# Patient Record
Sex: FEMALE | Race: WHITE | Employment: OTHER | URBAN - METROPOLITAN AREA
[De-identification: names, ages, dates, MRNs, and addresses within clinical notes are randomized per-mention and may not be internally consistent; named-entity substitution may affect disease eponyms.]

---

## 2017-01-10 ENCOUNTER — OFFICE VISIT (OUTPATIENT)
Dept: FAMILY MEDICINE | Facility: CLINIC | Age: 67
End: 2017-01-10

## 2017-01-10 VITALS
DIASTOLIC BLOOD PRESSURE: 80 MMHG | BODY MASS INDEX: 28.12 KG/M2 | RESPIRATION RATE: 20 BRPM | HEART RATE: 68 BPM | SYSTOLIC BLOOD PRESSURE: 120 MMHG | WEIGHT: 168.8 LBS | TEMPERATURE: 97.7 F | HEIGHT: 65 IN

## 2017-01-10 DIAGNOSIS — Z87.891 FORMER SMOKER: ICD-10-CM

## 2017-01-10 DIAGNOSIS — Z82.49 FAMILY HISTORY OF ISCHEMIC HEART DISEASE: ICD-10-CM

## 2017-01-10 DIAGNOSIS — F33.42 MAJOR DEPRESSIVE DISORDER, RECURRENT EPISODE, IN FULL REMISSION (H): ICD-10-CM

## 2017-01-10 DIAGNOSIS — Z00.00 ENCOUNTER FOR ROUTINE ADULT HEALTH EXAMINATION WITHOUT ABNORMAL FINDINGS: Primary | ICD-10-CM

## 2017-01-10 DIAGNOSIS — G47.00 INSOMNIA, UNSPECIFIED TYPE: ICD-10-CM

## 2017-01-10 DIAGNOSIS — Z13.228 SCREENING FOR METABOLIC DISORDER: ICD-10-CM

## 2017-01-10 DIAGNOSIS — Z86.2 HISTORY OF ANEMIA: ICD-10-CM

## 2017-01-10 DIAGNOSIS — K21.9 GASTROESOPHAGEAL REFLUX DISEASE, ESOPHAGITIS PRESENCE NOT SPECIFIED: ICD-10-CM

## 2017-01-10 DIAGNOSIS — Z11.59 NEED FOR HEPATITIS C SCREENING TEST: ICD-10-CM

## 2017-01-10 DIAGNOSIS — Z23 NEED FOR VACCINATION: ICD-10-CM

## 2017-01-10 DIAGNOSIS — E78.5 HYPERLIPIDEMIA LDL GOAL <160: ICD-10-CM

## 2017-01-10 LAB
% GRANULOCYTES: 61.1 %
HCT VFR BLD AUTO: 42 % (ref 35–47)
HEMOGLOBIN: 13.6 G/DL (ref 11.7–15.7)
LYMPHOCYTES NFR BLD AUTO: 29.3 %
MCH RBC QN AUTO: 29.5 PG (ref 26–33)
MCHC RBC AUTO-ENTMCNC: 32.4 G/DL (ref 31–36)
MCV RBC AUTO: 91.1 FL (ref 78–100)
MONOCYTES NFR BLD AUTO: 9.6 %
PLATELET COUNT - QUEST: 287 10^9/L (ref 150–375)
RBC # BLD AUTO: 4.61 10*12/L (ref 3.8–5.2)
WBC # BLD AUTO: 7.2 10*9/L (ref 4–11)

## 2017-01-10 PROCEDURE — 90686 IIV4 VACC NO PRSV 0.5 ML IM: CPT | Performed by: PHYSICIAN ASSISTANT

## 2017-01-10 PROCEDURE — 99396 PREV VISIT EST AGE 40-64: CPT | Mod: 25 | Performed by: PHYSICIAN ASSISTANT

## 2017-01-10 PROCEDURE — 36415 COLL VENOUS BLD VENIPUNCTURE: CPT | Performed by: PHYSICIAN ASSISTANT

## 2017-01-10 PROCEDURE — 86803 HEPATITIS C AB TEST: CPT | Mod: 90 | Performed by: PHYSICIAN ASSISTANT

## 2017-01-10 PROCEDURE — 85025 COMPLETE CBC W/AUTO DIFF WBC: CPT | Performed by: PHYSICIAN ASSISTANT

## 2017-01-10 PROCEDURE — 80061 LIPID PANEL: CPT | Mod: 90 | Performed by: PHYSICIAN ASSISTANT

## 2017-01-10 PROCEDURE — 90471 IMMUNIZATION ADMIN: CPT | Performed by: PHYSICIAN ASSISTANT

## 2017-01-10 PROCEDURE — 80053 COMPREHEN METABOLIC PANEL: CPT | Mod: 90 | Performed by: PHYSICIAN ASSISTANT

## 2017-01-10 RX ORDER — VENLAFAXINE HYDROCHLORIDE 150 MG/1
TABLET, EXTENDED RELEASE ORAL
Qty: 180 TABLET | Refills: 1 | Status: CANCELLED | OUTPATIENT
Start: 2017-01-10

## 2017-01-10 RX ORDER — TRAZODONE HYDROCHLORIDE 100 MG/1
100 TABLET ORAL
Qty: 180 TABLET | Refills: 1 | Status: CANCELLED | OUTPATIENT
Start: 2017-01-10

## 2017-01-10 RX ORDER — SIMVASTATIN 20 MG
TABLET ORAL
Qty: 90 TABLET | Refills: 3 | Status: SHIPPED | OUTPATIENT
Start: 2017-01-10 | End: 2017-09-27

## 2017-01-10 NOTE — PROGRESS NOTES
Chief Complaint: Physical Exam    SUBJECTIVE:   Alex Graham is a 66 year old female presents for routine health maintenance.    Current concerns: Has father that was recently found to have mitral regurgitation that required a mitral clip, and she is wondering if she should have a stress test.    Goes to psych PA two times per year, Navya Le, who prescribes her trazodone and Effexor. She is stable on this regime for both her depression and insomnia.     Menses are absent    No LMP recorded. Patient is postmenopausal.    Was last Pap smear normal: Yes  Due for mammogram:  No    Body mass index is 28.54 kg/(m^2).    Present exercise habits:  Nothing formal  Present dietary habits:  Really only eats dinner as she does not have an appetite for breakfast/lunch, likely in part due to reflux. Admits she eats a lot of sugar and ice cream. Does okay on fruits and veggies, but appetite is sporadic.    For her GERD, Alex has done trials of omeprazole, lansoprazole and ranitidine in the past. She did not tolerate these as they gave her neck stiffness that resolved when she stopped taking them.     Calcium intake:  Takes supplement.  Vit D intake: is taking supplement    Is the patient a smoker? No  If yes, smoking cessation advised and counseling provided.     Cardiovascular risk factors: previous smoker    Over the past few weeks, have you felt down or depressed? Little interest or pleasure in doing things? No    If in a relationship are there any Domestic violence concern: No    Last dental appointment:  this year  Last optical appointment:  this year    Was the patient born between [birth year] and has not had Hep C testing?  Yes, test will be ordered today    I have reviewed the following histories: Past Medical History, Past Surgical History, Social History, Family History, Problem List, Medication List and Allergies    Past Medical History   Diagnosis Date     Depressive disorder, not elsewhere classified      Sciatica  2000     L4-5, L2-3     Other acne      Alcohol abuse, in remission      Plantar fasciitis      Family History   Problem Relation Age of Onset     Thyroid Disease Mother      DIABETES Mother      C.A.D. Father      MI x4, stents, balloons, mitral regurgitation recently clipped     Heart Failure Mother      Ulcerative Colitis Sister      Emphysema Paternal Grandmother      Social History     Social History     Marital Status:      Spouse Name: N/A     Number of Children: 2     Years of Education: N/A     Occupational History     teacher Retired     2016     Social History Main Topics     Smoking status: Former Smoker -- 0.50 packs/day for 4 years     Types: Cigarettes     Smokeless tobacco: Never Used      Comment: smoked as a teen     Alcohol Use: No      Comment: alcoholic in remission since 20105     Drug Use: No     Sexual Activity:     Partners: Male     Other Topics Concern     Exercise Yes     doesn't exercise much during schoolyear     Seat Belt Yes     Social History Narrative     Past Surgical History   Procedure Laterality Date     C nonspecific procedure       BREAST REDUCTION     Diskectomy, lumbar, single sp  10/00     L4-5     C open treatment prox humeral fracture  1/00     Colonoscopy  12-15-08     normal  repeat in 10 years       ROS:  E/M: NEGATIVE for ear, nose, mouth and throat problems  R: NEGATIVE for significant/chronic cough or SOB  CV: NEGATIVE for chest pain or palpitations  GI: NEGATIVE for abdominal pain, chronic diarrhea or constipation  :  NEGATIVE for dysuria, hematuria or vaginal discharge. No sexual health concerns.       Current Outpatient Prescriptions   Medication     simvastatin (ZOCOR) 20 MG tablet     venlafaxine (EFFEXOR-ER) 150 MG TB24     traZODone (DESYREL) 100 MG tablet     CALCIUM-MAGNESUIUM-ZINC 333-133-8.3 MG TABS     GLUCOSAMINE OR     IRON (FERROUS SULFATE) TABS 325 MG OR     No current facility-administered medications for this visit.       Patient Active  "Problem List    Diagnosis Date Noted     Health Care Home 11/30/2012     Priority: Low     State Tier Level:  Tier 1  Status:  NA  Care Coordinator:      See Letters for H Care Plan           ACP (advance care planning) 10/19/2010     Priority: Low     Advance Care Planning 11/18/2015: ACP Review and Resources Provided:  Reviewed chart for advance care plan.  Alex Graham has no plan or code status on file. Discussed available resources and provided with information. Confirmed code status reflects current choices pending further ACP discussions.  Confirmed/documented legally designated decision maker(s). Added by Tawana Arthur               Major depressive disorder, recurrent episode, in full remission (H) 03/08/2016     Per [facility] records 2016       H pylori ulcer 07/18/2013     Bipolar affective (H) 03/15/2011     Hyperlipidemia LDL goal <160 08/23/2008     Family history of diabetes mellitus 07/06/2007         OBJECTIVE:  /80 mmHg  Pulse 68  Temp(Src) 97.7  F (36.5  C) (Oral)  Resp 20  Ht 1.638 m (5' 4.5\")  Wt 76.567 kg (168 lb 12.8 oz)  BMI 28.54 kg/m2        General: 66 year old female who appears her stated age. Vital signs noted.  Head: Normocephalic  Eyes: pupils equal round reactive to light and accomodation, extra ocular movements intact  Ears: external canals and TMs free of abnormalities  Nose: patent, without mucosal abnormalities  Mouth and throat: without erythema or lesions of the mucosa  Neck: supple, without adenopathy or thyromegaly  Lungs: clear to auscultation, no wheezing or crackles  Breasts: Deferred  Cv: regular rate and rhythm, normal s1 and s2 without murmur or click  Abd: soft, non-tender, no masses, no hepatomegaly or splenomegaly.   (female): Deferred  Ms: normal muscle tone & symmetry  Skin: clear to inspection and with no palpable abnormalities.  Neuro: sensation and motor function grossly intact; cranial nerves without obvious " "abnormalities.    ASSESSMENT/PLAN:    1. Encounter for routine adult health examination without abnormal findings  Health maintenance up to date. Recommended Dexa scan and pneumonia shot at next years physical    2. Hyperlipidemia LDL goal <160  - simvastatin (ZOCOR) 20 MG tablet; TAKE 1 TABLET (20 MG) BY MOUTH AT BEDTIME  Dispense: 90 tablet; Refill: 3  - VENOUS COLLECTION  - Lipid Profile ([facility])    3. Screening for metabolic disorder  - VENOUS COLLECTION  - COMPREHENSIVE METABOLIC PANEL ([facility]) XCMP    4. Need for hepatitis C screening test  - VENOUS COLLECTION  - Hepatitis C antibody    5. History of anemia  - VENOUS COLLECTION  - CL AFF HEMOGRAM/PLATE/DIFF ([facility])    6. Major depressive disorder, recurrent episode, in full remission (H)  Effexor managed by psychologist. PHQ-9 score today is 2.     7. Insomnia, unspecified type  Well-controlled on trazodone, prescription by psych PA.     8. Gastroesophageal reflux disease, esophagitis presence not specified  Recommended trial on Nexium if tolerated and effective, will do a prior authorization to have this covered.     9. Family history of ischemic heart disease  Given significant family history in her father, as well as Alex's risk factors of former smoker, hyperlipidemia, sedentary lifestyle, recommended a stress echo.            reports that she has quit smoking. She has never used smokeless tobacco.      Estimated body mass index is 28.54 kg/(m^2) as calculated from the following:    Height as of this encounter: 1.638 m (5' 4.5\").    Weight as of this encounter: 76.567 kg (168 lb 12.8 oz).  Weight management plan: Discussed healthy diet and exercise guidelines and patient will follow up in 12 months in clinic to re-evaluate.      Labs pending:      Fasting glucose      Fasting lipids      Hepatitis C  Meds Suggested:      Vitamin D       Calcium  Tests Recommended:      Regular Dental Examinations        Eye exam  Behavior Modifications:       Cardiovascular " exercise 3 times per week--enough to get your Target Heart rate  Other recommendations:     BMI noted and discussed      Regular breast exam     Encouraged My Chart    Counseling Resources:  ATP IV Guidelines  Pooled Cohorts Equation Calculator  Breast Cancer Risk Calculator  FRAX Risk Assessment  ICSI Preventive Guidelines  Dietary Guidelines for Americans, 2010  [employer] MyPlate            Iris Giron PA-C  1/10/2017

## 2017-01-10 NOTE — NURSING NOTE
Alex WAN Santos is here for a CPX.    Pre-visit planning  Immunizations -up to date  Colonoscopy -is up to date  Mammogram -is up to date  Asthma test --  PHQ9 -  ANNELISE 7 -    Questioned patient about current smoking habits.  Pt. quit smoking some time ago.  Body mass index is 28.54 kg/(m^2).  BP Cuff L  Arm  M  Cuff  PULSE regular  My Chart:   CLASSIFICATION OF OVERWEIGHT AND OBESITY BY BMI                        Obesity Class           BMI(kg/m2)  Underweight                                    < 18.5  Normal                                         18.5-24.9  Overweight                                     25.0-29.9  OBESITY                     I                  30.0-34.9                             II                 35.0-39.9  EXTREME OBESITY             III                >40                            Patient's  BMI Body mass index is 28.54 kg/(m^2).  Http://hin.nhlbi.nih.gov/menuplanner/menu.cgi  ETOH screening:  Questions:  1-How often do you have a drink containing alcohol?                             0 times per   2-How many drinks containing alcohol do you have on a typical day when you are         Drinking?                                 3- How often do you have 5 or more drinks on one occasion?                               per     Have you ever:  @None of the patient's responses to the CAGE screening were positive / Negative CAGE score@

## 2017-01-11 LAB
ALBUMIN SERPL-MCNC: 4 G/DL (ref 3.6–5.1)
ALBUMIN/GLOB SERPL: 1.7 (CALC) (ref 1–2.5)
ALP SERPL-CCNC: 61 U/L (ref 33–130)
ALT SERPL-CCNC: 30 U/L (ref 6–29)
AST SERPL-CCNC: 23 U/L (ref 10–35)
BILIRUB SERPL-MCNC: 0.4 MG/DL (ref 0.2–1.2)
BUN SERPL-MCNC: 12 MG/DL (ref 7–25)
BUN/CREATININE RATIO: ABNORMAL (CALC) (ref 6–22)
CALCIUM SERPL-MCNC: 9.3 MG/DL (ref 8.6–10.4)
CHLORIDE SERPLBLD-SCNC: 102 MMOL/L (ref 98–110)
CHOLEST SERPL-MCNC: 229 MG/DL (ref 125–200)
CHOLEST/HDLC SERPL: 4.2 (CALC)
CO2 SERPL-SCNC: 26 MMOL/L (ref 20–31)
CREAT SERPL-MCNC: 0.82 MG/DL (ref 0.5–0.99)
EGFR AFRICAN AMERICAN - QUEST: 88 ML/MIN/1.73M2
GFR SERPL CREATININE-BSD FRML MDRD: 76 ML/MIN/1.73M2
GLOBULIN, CALCULATED - QUEST: 2.3 G/DL (CALC) (ref 1.9–3.7)
GLUCOSE - QUEST: 95 MG/DL (ref 65–99)
HCV AB - QUEST: NORMAL
HDLC SERPL-MCNC: 54 MG/DL
LDLC SERPL CALC-MCNC: 145 MG/DL (CALC)
NONHDLC SERPL-MCNC: 175 MG/DL (CALC)
POTASSIUM SERPL-SCNC: 4.3 MMOL/L (ref 3.5–5.3)
PROT SERPL-MCNC: 6.3 G/DL (ref 6.1–8.1)
SIGNAL TO CUT OFF - QUEST: 0.06
SODIUM SERPL-SCNC: 137 MMOL/L (ref 135–146)
TRIGL SERPL-MCNC: 151 MG/DL

## 2017-01-11 ASSESSMENT — PATIENT HEALTH QUESTIONNAIRE - PHQ9: SUM OF ALL RESPONSES TO PHQ QUESTIONS 1-9: 2

## 2017-01-17 ENCOUNTER — HOSPITAL ENCOUNTER (OUTPATIENT)
Dept: CARDIOLOGY | Facility: CLINIC | Age: 67
Discharge: HOME | End: 2017-01-17
Attending: PHYSICIAN ASSISTANT | Admitting: PHYSICIAN ASSISTANT
Payer: COMMERCIAL

## 2017-01-17 DIAGNOSIS — E78.5 HYPERLIPIDEMIA LDL GOAL <160: ICD-10-CM

## 2017-01-17 DIAGNOSIS — Z87.891 FORMER SMOKER: ICD-10-CM

## 2017-01-17 DIAGNOSIS — Z82.49 FAMILY HISTORY OF ISCHEMIC HEART DISEASE: ICD-10-CM

## 2017-01-17 PROCEDURE — 93018 CV STRESS TEST I&R ONLY: CPT | Performed by: INTERNAL MEDICINE

## 2017-01-17 PROCEDURE — 93350 STRESS TTE ONLY: CPT | Mod: 26 | Performed by: INTERNAL MEDICINE

## 2017-01-17 PROCEDURE — 93321 DOPPLER ECHO F-UP/LMTD STD: CPT | Mod: 26 | Performed by: INTERNAL MEDICINE

## 2017-01-17 PROCEDURE — 93325 DOPPLER ECHO COLOR FLOW MAPG: CPT | Mod: 26 | Performed by: INTERNAL MEDICINE

## 2017-01-17 PROCEDURE — 40000264 ECHO STRESS TEST WITH LUMASON

## 2017-01-17 PROCEDURE — 25500064 ZZH RX 255 OP 636: Performed by: PHYSICIAN ASSISTANT

## 2017-01-17 RX ADMIN — SULFUR HEXAFLUORIDE 5 ML: KIT at 08:28

## 2017-02-28 ENCOUNTER — TELEPHONE (OUTPATIENT)
Dept: FAMILY MEDICINE | Facility: CLINIC | Age: 67
End: 2017-02-28

## 2017-02-28 NOTE — TELEPHONE ENCOUNTER
Pt called about her Acid reflux and the medication that she was put on for it.   Tried to return the call back to Pt but the answer machine picked up and said to put in remote number.   Will call back later today.   THOMAS Pace ([facility])

## 2017-02-28 NOTE — TELEPHONE ENCOUNTER
Corina,     Pt called about the Nexium and how long she can take it?  She states that she is good when she is on it, but when she is off from it is when she is having problems.   She also states that he had enough of the samples until 3 days ago.   She has some questions for you like will this medication give her problems with her kidneys long term?  How long can she continue taking the medication.     Please advise.     Georgie.THOMAS Cool ([facility])    Pt's number: [phone]

## 2017-03-01 DIAGNOSIS — K21.9 GASTROESOPHAGEAL REFLUX DISEASE, ESOPHAGITIS PRESENCE NOT SPECIFIED: Primary | ICD-10-CM

## 2017-03-02 NOTE — PROGRESS NOTES
Doing well while on it. Bad when goes off it. Will do PA/medical necessity as needed for this med to get covered. Will monitor for side effects.

## 2017-03-10 ENCOUNTER — TELEPHONE (OUTPATIENT)
Dept: FAMILY MEDICINE | Facility: CLINIC | Age: 67
End: 2017-03-10

## 2017-03-10 NOTE — TELEPHONE ENCOUNTER
Medical records release from [facility]. Authorization ongoing communication between pcp and [facility].

## 2017-09-27 ENCOUNTER — OFFICE VISIT (OUTPATIENT)
Dept: FAMILY MEDICINE | Facility: CLINIC | Age: 67
End: 2017-09-27

## 2017-09-27 VITALS
WEIGHT: 165.2 LBS | OXYGEN SATURATION: 98 % | SYSTOLIC BLOOD PRESSURE: 114 MMHG | DIASTOLIC BLOOD PRESSURE: 82 MMHG | HEART RATE: 70 BPM | TEMPERATURE: 98.3 F | BODY MASS INDEX: 27.52 KG/M2 | HEIGHT: 65 IN

## 2017-09-27 DIAGNOSIS — Z23 NEED FOR VACCINATION: ICD-10-CM

## 2017-09-27 DIAGNOSIS — E78.2 MIXED HYPERLIPIDEMIA: ICD-10-CM

## 2017-09-27 DIAGNOSIS — R53.81 MALAISE: ICD-10-CM

## 2017-09-27 PROCEDURE — 90686 IIV4 VACC NO PRSV 0.5 ML IM: CPT | Performed by: FAMILY MEDICINE

## 2017-09-27 PROCEDURE — 80053 COMPREHEN METABOLIC PANEL: CPT | Mod: 90 | Performed by: FAMILY MEDICINE

## 2017-09-27 PROCEDURE — 84443 ASSAY THYROID STIM HORMONE: CPT | Mod: 90 | Performed by: FAMILY MEDICINE

## 2017-09-27 PROCEDURE — 99213 OFFICE O/P EST LOW 20 MIN: CPT | Mod: 25 | Performed by: FAMILY MEDICINE

## 2017-09-27 PROCEDURE — G0009 ADMIN PNEUMOCOCCAL VACCINE: HCPCS | Performed by: FAMILY MEDICINE

## 2017-09-27 PROCEDURE — 90670 PCV13 VACCINE IM: CPT | Performed by: FAMILY MEDICINE

## 2017-09-27 PROCEDURE — 80061 LIPID PANEL: CPT | Mod: 90 | Performed by: FAMILY MEDICINE

## 2017-09-27 PROCEDURE — G0008 ADMIN INFLUENZA VIRUS VAC: HCPCS | Performed by: FAMILY MEDICINE

## 2017-09-27 PROCEDURE — 36415 COLL VENOUS BLD VENIPUNCTURE: CPT | Performed by: FAMILY MEDICINE

## 2017-09-27 RX ORDER — SIMVASTATIN 20 MG
TABLET ORAL
Qty: 90 TABLET | Refills: 3 | Status: SHIPPED | OUTPATIENT
Start: 2017-09-27 | End: 2018-08-10

## 2017-09-27 NOTE — MR AVS SNAPSHOT
"              After Visit Summary   9/27/2017    Alex Graham    MRN: [id number]           Patient Information     Date Of Birth          1950        Visit Information        Provider Department      9/27/2017 7:30 AM Taylor Villanueva MD [facility]        Today's Diagnoses     Mixed hyperlipidemia        Malaise        Need for vaccination           Follow-ups after your visit        Who to contact     If you have questions or need follow up information about today's clinic visit or your schedule please contact [facility] directly at [phone].  Normal or non-critical lab and imaging results will be communicated to you by MyChart, letter or phone within 4 business days after the clinic has received the results. If you do not hear from us within 7 days, please contact the clinic through MyChart or phone. If you have a critical or abnormal lab result, we will notify you by phone as soon as possible.  Submit refill requests through [employer] or call your pharmacy and they will forward the refill request to us. Please allow 3 business days for your refill to be completed.          Additional Information About Your Visit        Care EveryWhere ID     This is your Care EveryWhere ID. This could be used by other organizations to access your [facility] medical records  [id number]        Your Vitals Were     Pulse Temperature Height Pulse Oximetry Breastfeeding? BMI (Body Mass Index)    70 98.3  F (36.8  C) (Oral) 1.638 m (5' 4.5\") 98% No 27.92 kg/m2       Blood Pressure from Last 3 Encounters:   09/27/17 114/82   01/10/17 120/80   04/27/16 128/78    Weight from Last 3 Encounters:   09/27/17 74.9 kg (165 lb 3.2 oz)   01/10/17 76.6 kg (168 lb 12.8 oz)   04/27/16 79.8 kg (176 lb)              We Performed the Following     ADMIN: Vaccine, Initial (74719)     Comprehensive metabolic panel     HC FLU VAC PRESRV FREE QUAD SPLIT VIR 3+YRS IM     Lipid Profile     " Pneumococcal vaccine 13 valent PCV13 IM (Prevnar) [40438]     TSH with free T4 reflex (QUEST)     VACCINE ADMINISTRATION, EACH ADDITIONAL     VENOUS COLLECTION          Today's Medication Changes          These changes are accurate as of: 9/27/17  9:44 PM.  If you have any questions, ask your nurse or doctor.               These medicines have changed or have updated prescriptions.        Dose/Directions    esomeprazole 20 MG CR capsule   Commonly known as:  nexIUM   This may have changed:  Another medication with the same name was removed. Continue taking this medication, and follow the directions you see here.   Used for:  Gastroesophageal reflux disease, esophagitis presence not specified   Changed by:  Iris Giron PA-C        Dose:  20 mg   Take 1 capsule (20 mg) by mouth every morning (before breakfast) Take 30-60 minutes before eating.   Quantity:  30 capsule   Refills:  11            Where to get your medicines      These medications were sent to [id number] - [place] [id number] [address]     Phone:  [phone]     simvastatin 20 MG tablet                Primary Care Provider Office Phone # Fax #    Taylor Villanueva [phone] [address] [address]        Equal Access to Services     [place] : Derick padillao Sojj, waaxda luqadaha, qaybta kaalmada adeegyada, luis leroy. So [facility] [phone].    ATENCIÓN: Si habla español, tiene a patel disposición servicios gratuitos de asistencia lingüística. Llame al [phone].    We comply with applicable federal civil rights laws and [place] laws. We do not discriminate on the basis of race, color, national origin, age, disability sex, sexual orientation or gender identity.            Thank you!     Thank you for choosing [facility]  for your care. Our goal is always to provide you  with excellent care. Hearing back from our patients is one way we can continue to improve our services. Please take a few minutes to complete the written survey that you may receive in the mail after your visit with us. Thank you!             Your Updated Medication List - Protect others around you: Learn how to safely use, store and throw away your medicines at www.disposemymeds.org.          This list is accurate as of: 9/27/17  9:44 PM.  Always use your most recent med list.                   Brand Name Dispense Instructions for use Diagnosis    CALCIUM-MAGNESUIUM-ZINC 333-133-8.3 MG Tabs      Take 1 tablet by mouth daily.        esomeprazole 20 MG CR capsule    nexIUM    30 capsule    Take 1 capsule (20 mg) by mouth every morning (before breakfast) Take 30-60 minutes before eating.    Gastroesophageal reflux disease, esophagitis presence not specified       GLUCOSAMINE PO      None Entered        IRON (FERROUS SULFATE) TABS 325 MG OR      1 TABLET DAILY        simvastatin 20 MG tablet    ZOCOR    90 tablet    TAKE 1 TABLET (20 MG) BY MOUTH AT BEDTIME    Mixed hyperlipidemia       traZODone 100 MG tablet    DESYREL    180 tablet    Take 1 tablet (100 mg) by mouth nightly as needed for sleep        venlafaxine 150 MG Tb24 24 hr tablet    EFFEXOR-ER    180 tablet    TAKE 2 TABLETS (300 MG) BY MOUTH DAILY (WITH BREAKFAST)

## 2017-09-27 NOTE — PROGRESS NOTES
"  SUBJECTIVE:   Alex Graham is a 67 year old female who presents to clinic today for the following health issues:      Hyperlipidemia Follow-Up      Rate your low fat/cholesterol diet?: \"bad\"    Eating lots of vegetables (has on garden)- sweets    Eats salty foods- trial mix, salted nuts- crackers        Taking statin?   (has been taking simvastatin only for the past two weeks)        Other lipid medications/supplements?:  none        Amount of exercise or physical activity: working at [employer]    Encouraged regular exercise:     Problems taking medications regularly: Yes,  Lost her Simvastatin-    Medication side effects: none    PROBLEMS TO ADD ON...    More tired: more physical work  Depression- controlled  Navya KAY manages    Problem list and histories reviewed & adjusted, as indicated.  Additional history: as documented    Patient Active Problem List   Diagnosis     Family history of diabetes mellitus     Hyperlipidemia LDL goal <160     ACP (advance care planning)     Bipolar affective (H)     Health Care Home     H pylori ulcer     Major depressive disorder, recurrent episode, in full remission (H)     Past Surgical History:   Procedure Laterality Date     C NONSPECIFIC PROCEDURE      BREAST REDUCTION     COLONOSCOPY  12-15-08    normal  repeat in 10 years     DISKECTOMY, LUMBAR, SINGLE SP  10/00    L4-5     HC OPEN TREATMENT PROX HUMERAL FRACTURE  1/00       Social History   Substance Use Topics     Smoking status: Former Smoker     Packs/day: 0.50     Years: 4.00     Types: Cigarettes     Smokeless tobacco: Never Used      Comment: smoked as a teen     Alcohol use No      Comment: alcoholic in remission since 20105     Family History   Problem Relation Age of Onset     Thyroid Disease Mother      DIABETES Mother      C.A.D. 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 No  BMI 27.92 kg/m2  Body mass index is 27.92 kg/(m^2).   Regular rate and  rhythm. S1 and S2 normal, no murmurs, clicks, gallops or rubs. No edema or JVD. Chest is clear; no wheezes or rales.      Diagnostic Test Results:  No results found for this or any previous visit (from the past 24 hour(s)).    ASSESSMENT/PLAN:     Problem List Items Addressed This Visit     Hyperlipidemia LDL goal <160    Relevant Medications    simvastatin (ZOCOR) 20 MG tablet      Other Visit Diagnoses     Mixed hyperlipidemia    -  Primary    Relevant Medications    simvastatin (ZOCOR) 20 MG tablet    Other Relevant Orders    Lipid Profile    Need for vaccination        Relevant Orders    Pneumococcal vaccine 13 valent PCV13 IM (Prevnar) [92195] (Completed)    ADMIN: Vaccine, Initial (93165) (Completed)    HC FLU VAC PRESRV FREE QUAD SPLIT VIR 3+YRS IM (Completed)    VACCINE ADMINISTRATION, EACH ADDITIONAL (Completed)    Malaise        Relevant Orders    Comprehensive metabolic panel    TSH with free T4 reflex (QUEST)           BMI:   Estimated body mass index is 27.92 kg/(m^2) as calculated from the following:    Height as of this encounter: 1.638 m (5' 4.5\").    Weight as of this encounter: 74.9 kg (165 lb 3.2 oz).   Weight management plan: Discussed healthy diet and exercise guidelines and patient will follow " up in 12 months in clinic to re-evaluate.        MEDICATIONS:  Continue current medications without change  Lipid result may be difficult to interpret- just recently started taking  Work on weight loss  Regular exercise  Recheck in one year    Taylor Villanueva MD  [facility]

## 2017-09-27 NOTE — NURSING NOTE
Alex is here for a fasting medication recheck and blood work.     Pre-Visit Screening :  Immunizations : not up to date - Pneumococcal     Colonoscopy : is up to date  Mammogram : is up to date  Asthma Action Test/Plan : NA  PHQ9/GAD7 :  Seeing another provider for this.     Pulse - regular  My Chart - declines    CLASSIFICATION OF OVERWEIGHT AND OBESITY BY BMI                         Obesity Class           BMI(kg/m2)  Underweight                                    < 18.5  Normal                                         18.5-24.9  Overweight                                     25.0-29.9  OBESITY                     I                  30.0-34.9                              II                 35.0-39.9  EXTREME OBESITY             III                >40                             Patient's  BMI Body mass index is 27.92 kg/(m^2).  http://hin.nhlbi.nih.gov/menuplanner/menu.cgi  Questioned patient about current smoking habits.  Pt. Quit sometime ago    Georgie.THOMAS Cool ([facility])

## 2017-09-27 NOTE — LETTER
September 28, 2017      Alex Graham  [address]  [place] [address]        Dear ,    We are writing to inform you of your test results.    Lipid results are difficult to interpret with dosing just for the past two weeks.   Please continue taking your current dose of Simvastatin but repeat your fasting lipid profile in three months (this can be a lab only appointment). A change in medication or dose adjustment may be needed.    Thyroid function test is Normal.  Your comprehensive profile including blood sugar, potassium and kidney and liver function tests were Normal.    Let me know if you have questions.    Resulted Orders   Lipid Profile   Result Value Ref Range    Cholesterol 229 (H) <200 mg/dL    HDL Cholesterol 60 >50 mg/dL    Triglycerides 62 <150 mg/dL    LDL Cholesterol Calculated 154 (H) mg/dL (calc)      Comment:      Reference range: <100     Desirable range <100 mg/dL for patients with CHD or  diabetes and <70 mg/dL for diabetic patients with  known heart disease.     LDL-C is now calculated using the Vishal   calculation, which is a validated novel method providing   better accuracy than the Friedewald equation in the   estimation of LDL-C.   Buster SS et al. BRI. 2013;310(19): 3390-7913   (http://education."[employer]orb[employer]/faq/BQP431)      Cholesterol/HDL Ratio 3.8 <5.0 (calc)    Non HDL Cholesterol 169 (H) <130 mg/dL (calc)      Comment:      For patients with diabetes plus 1 major ASCVD risk   factor, treating to a non-HDL-C goal of <100 mg/dL   (LDL-C of <70 mg/dL) is considered a therapeutic   option.     Comprehensive metabolic panel   Result Value Ref Range    Glucose 98 65 - 99 mg/dL      Comment:                    Fasting reference interval         Urea Nitrogen 15 7 - 25 mg/dL    Creatinine 0.76 0.50 - 0.99 mg/dL      Comment:      For patients >49 years of age, the reference limit  for Creatinine is approximately 13% higher for people  identified as  -American.         GFR Estimate 82 > OR = 60 mL/min/1.73m2    EGFR African American 95 > OR = 60 mL/min/1.73m2    BUN/Creatinine Ratio NOT APPLICABLE 6 - 22 (calc)    Sodium 138 135 - 146 mmol/L    Potassium 4.2 3.5 - 5.3 mmol/L    Chloride 104 98 - 110 mmol/L    Carbon Dioxide 23 20 - 31 mmol/L    Calcium 9.1 8.6 - 10.4 mg/dL    Protein Total 6.5 6.1 - 8.1 g/dL    Albumin 4.2 3.6 - 5.1 g/dL    Globulin Calculated 2.3 1.9 - 3.7 g/dL (calc)    A/G Ratio 1.8 1.0 - 2.5 (calc)    Bilirubin Total 0.6 0.2 - 1.2 mg/dL    Alkaline Phosphatase 64 33 - 130 U/L    AST 18 10 - 35 U/L    ALT 18 6 - 29 U/L   TSH with free T4 reflex (QUEST)   Result Value Ref Range    TSH 4.08 0.40 - 4.50 mIU/L       If you have any questions or concerns, please call the clinic at the number listed above.       Sincerely,        Taylor Villanueva MD

## 2017-09-28 LAB
ALBUMIN SERPL-MCNC: 4.2 G/DL (ref 3.6–5.1)
ALBUMIN/GLOB SERPL: 1.8 (CALC) (ref 1–2.5)
ALP SERPL-CCNC: 64 U/L (ref 33–130)
ALT SERPL-CCNC: 18 U/L (ref 6–29)
AST SERPL-CCNC: 18 U/L (ref 10–35)
BILIRUB SERPL-MCNC: 0.6 MG/DL (ref 0.2–1.2)
BUN SERPL-MCNC: 15 MG/DL (ref 7–25)
BUN/CREATININE RATIO: NORMAL (CALC) (ref 6–22)
CALCIUM SERPL-MCNC: 9.1 MG/DL (ref 8.6–10.4)
CHLORIDE SERPLBLD-SCNC: 104 MMOL/L (ref 98–110)
CHOLEST SERPL-MCNC: 229 MG/DL
CHOLEST/HDLC SERPL: 3.8 (CALC)
CO2 SERPL-SCNC: 23 MMOL/L (ref 20–31)
CREAT SERPL-MCNC: 0.76 MG/DL (ref 0.5–0.99)
EGFR AFRICAN AMERICAN - QUEST: 95 ML/MIN/1.73M2
GFR SERPL CREATININE-BSD FRML MDRD: 82 ML/MIN/1.73M2
GLOBULIN, CALCULATED - QUEST: 2.3 G/DL (CALC) (ref 1.9–3.7)
GLUCOSE - QUEST: 98 MG/DL (ref 65–99)
HDLC SERPL-MCNC: 60 MG/DL
LDLC SERPL CALC-MCNC: 154 MG/DL (CALC)
NONHDLC SERPL-MCNC: 169 MG/DL (CALC)
POTASSIUM SERPL-SCNC: 4.2 MMOL/L (ref 3.5–5.3)
PROT SERPL-MCNC: 6.5 G/DL (ref 6.1–8.1)
SODIUM SERPL-SCNC: 138 MMOL/L (ref 135–146)
TRIGL SERPL-MCNC: 62 MG/DL
TSH SERPL-ACNC: 4.08 MIU/L (ref 0.4–4.5)

## 2017-12-22 ENCOUNTER — TRANSFERRED RECORDS (OUTPATIENT)
Dept: FAMILY MEDICINE | Facility: CLINIC | Age: 67
End: 2017-12-22

## 2017-12-22 DIAGNOSIS — E78.2 MIXED HYPERLIPIDEMIA: ICD-10-CM

## 2017-12-22 PROCEDURE — 80061 LIPID PANEL: CPT | Mod: 90 | Performed by: FAMILY MEDICINE

## 2017-12-22 PROCEDURE — 36415 COLL VENOUS BLD VENIPUNCTURE: CPT | Performed by: FAMILY MEDICINE

## 2017-12-22 NOTE — LETTER
December 26, 2017      Alex Graham  [address]  [place] [address]        Dear ,    We are writing to inform you of your test results.    Lipid profile results are similar to last September.  LDL or bad cholesterol remains mildly elevated. I recommend increasing Simvastatin to 40 mg - this will require another fasting lipid and liver lab test in 8-12 weeks.  Let me know if you are agreeable.    Resulted Orders   Lipid Profile (QUEST)   Result Value Ref Range    Cholesterol 223 (H) <200 mg/dL    HDL Cholesterol 51 >50 mg/dL    Triglycerides 148 <150 mg/dL    LDL Cholesterol Calculated 145 (H) mg/dL (calc)      Comment:      Reference range: <100     Desirable range <100 mg/dL for patients with CHD or  diabetes and <70 mg/dL for diabetic patients with  known heart disease.     LDL-C is now calculated using the Buster-Vandana   calculation, which is a validated novel method providing   better accuracy than the Friedewald equation in the   estimation of LDL-C.   Buster SS et al. BRI. 2013;310(19): 6748-7428   (http://education."[employer]/faq/BAR345)      Cholesterol/HDL Ratio 4.4 <5.0 (calc)    Non HDL Cholesterol 172 (H) <130 mg/dL (calc)      Comment:      For patients with diabetes plus 1 major ASCVD risk   factor, treating to a non-HDL-C goal of <100 mg/dL   (LDL-C of <70 mg/dL) is considered a therapeutic   option.         If you have any questions or concerns, please call the clinic at the number listed above.       Sincerely,        Taylor Villanueva MD

## 2017-12-23 LAB
CHOLEST SERPL-MCNC: 223 MG/DL
CHOLEST/HDLC SERPL: 4.4 (CALC)
HDLC SERPL-MCNC: 51 MG/DL
LDLC SERPL CALC-MCNC: 145 MG/DL (CALC)
NONHDLC SERPL-MCNC: 172 MG/DL (CALC)
TRIGL SERPL-MCNC: 148 MG/DL

## 2018-08-10 DIAGNOSIS — E78.2 MIXED HYPERLIPIDEMIA: ICD-10-CM

## 2018-08-10 RX ORDER — SIMVASTATIN 20 MG
TABLET ORAL
Qty: 30 TABLET | Refills: 0 | COMMUNITY
Start: 2018-08-10 | End: 2018-10-23

## 2018-08-10 RX ORDER — SIMVASTATIN 20 MG
TABLET ORAL
Qty: 90 TABLET | Refills: 2 | OUTPATIENT
Start: 2018-08-10

## 2018-10-16 DIAGNOSIS — E78.2 MIXED HYPERLIPIDEMIA: ICD-10-CM

## 2018-10-16 NOTE — TELEPHONE ENCOUNTER
Received incoming refill request for  Pending Prescriptions:                       Disp   Refills    simvastatin (ZOCOR) 20 MG tablet [Pharmac*90 tab*2            Sig: TAKE 1 TABLET (20 MG) BY MOUTH AT BEDTIME     Patient was already given a 30 day supply back on 8/10/18 and has not yet made an appointment. Routing to Dr. Villanueva for approval or denial of giving anymore refills until patient is seen.

## 2018-10-17 RX ORDER — SIMVASTATIN 20 MG
TABLET ORAL
Qty: 90 TABLET | Refills: 2 | OUTPATIENT
Start: 2018-10-17

## 2018-10-23 ENCOUNTER — OFFICE VISIT (OUTPATIENT)
Dept: INTERNAL MEDICINE | Facility: CLINIC | Age: 68
End: 2018-10-23
Payer: COMMERCIAL

## 2018-10-23 VITALS
WEIGHT: 172.6 LBS | TEMPERATURE: 98.4 F | DIASTOLIC BLOOD PRESSURE: 82 MMHG | SYSTOLIC BLOOD PRESSURE: 112 MMHG | BODY MASS INDEX: 29.17 KG/M2 | RESPIRATION RATE: 24 BRPM | OXYGEN SATURATION: 99 %

## 2018-10-23 DIAGNOSIS — Z12.11 SPECIAL SCREENING FOR MALIGNANT NEOPLASMS, COLON: ICD-10-CM

## 2018-10-23 DIAGNOSIS — E05.90 SUBCLINICAL HYPERTHYROIDISM: ICD-10-CM

## 2018-10-23 DIAGNOSIS — K21.9 GASTROESOPHAGEAL REFLUX DISEASE WITHOUT ESOPHAGITIS: ICD-10-CM

## 2018-10-23 DIAGNOSIS — Z23 PNEUMOCOCCAL VACCINATION ADMINISTERED AT CURRENT VISIT: ICD-10-CM

## 2018-10-23 DIAGNOSIS — Z13.29 SCREENING FOR THYROID DISORDER: ICD-10-CM

## 2018-10-23 DIAGNOSIS — F33.42 MAJOR DEPRESSIVE DISORDER, RECURRENT EPISODE, IN FULL REMISSION (H): Primary | ICD-10-CM

## 2018-10-23 DIAGNOSIS — E78.2 MIXED HYPERLIPIDEMIA: ICD-10-CM

## 2018-10-23 PROCEDURE — 99203 OFFICE O/P NEW LOW 30 MIN: CPT | Mod: 25 | Performed by: INTERNAL MEDICINE

## 2018-10-23 PROCEDURE — 80061 LIPID PANEL: CPT | Performed by: INTERNAL MEDICINE

## 2018-10-23 PROCEDURE — 84443 ASSAY THYROID STIM HORMONE: CPT | Performed by: INTERNAL MEDICINE

## 2018-10-23 PROCEDURE — 90732 PPSV23 VACC 2 YRS+ SUBQ/IM: CPT | Performed by: INTERNAL MEDICINE

## 2018-10-23 PROCEDURE — 84439 ASSAY OF FREE THYROXINE: CPT | Performed by: INTERNAL MEDICINE

## 2018-10-23 PROCEDURE — G0009 ADMIN PNEUMOCOCCAL VACCINE: HCPCS | Performed by: INTERNAL MEDICINE

## 2018-10-23 PROCEDURE — 80053 COMPREHEN METABOLIC PANEL: CPT | Performed by: INTERNAL MEDICINE

## 2018-10-23 PROCEDURE — 36415 COLL VENOUS BLD VENIPUNCTURE: CPT | Performed by: INTERNAL MEDICINE

## 2018-10-23 RX ORDER — SIMVASTATIN 20 MG
TABLET ORAL
Qty: 30 TABLET | Refills: 0 | Status: SHIPPED | OUTPATIENT
Start: 2018-10-23 | End: 2019-01-23

## 2018-10-23 NOTE — MR AVS SNAPSHOT
After Visit Summary   10/23/2018    Alex Graham    MRN: [id number]           Patient Information     Date Of Birth          1950        Visit Information        Provider Department      10/23/2018 11:00 AM Piter Fowler MD [facility]        Today's Diagnoses     Major depressive disorder, recurrent episode, in full remission (H)    -  1    Gastroesophageal reflux disease without esophagitis        Subclinical hyperthyroidism        Mixed hyperlipidemia        Pneumococcal vaccination administered at current visit        Special screening for malignant neoplasms, colon        Screening for thyroid disorder           Follow-ups after your visit        Who to contact     If you have questions or need follow up information about today's clinic visit or your schedule please contact [facility] directly at [phone].  Normal or non-critical lab and imaging results will be communicated to you by MyChart, letter or phone within 4 business days after the clinic has received the results. If you do not hear from us within 7 days, please contact the clinic through MyChart or phone. If you have a critical or abnormal lab result, we will notify you by phone as soon as possible.  Submit refill requests through Weebly or call your pharmacy and they will forward the refill request to us. Please allow 3 business days for your refill to be completed.          Additional Information About Your Visit        Care EveryWhere ID     This is your Care EveryWhere ID. This could be used by other organizations to access your [place] medical records  [id number]        Your Vitals Were     Temperature Respirations Pulse Oximetry BMI (Body Mass Index)          98.4  F (36.9  C) (Oral) 24 99% 29.17 kg/m2         Blood Pressure from Last 3 Encounters:   10/23/18 112/82   09/27/17 114/82   01/10/17 120/80    Weight from Last 3 Encounters:   10/23/18 172 lb 9.6 oz (78.3 kg)   09/27/17  165 lb 3.2 oz (74.9 kg)   01/10/17 168 lb 12.8 oz (76.6 kg)              We Performed the Following     Comprehensive metabolic panel     Lipid panel reflex to direct LDL Fasting     PNEUMOCOCCAL VACCINE,ADULT,SQ OR IM     T4 free     T4 free     TSH with free T4 reflex          Today's Medication Changes          These changes are accurate as of 10/23/18 11:59 PM.  If you have any questions, ask your nurse or doctor.               Stop taking these medicines if you haven't already. Please contact your care team if you have questions.     CALCIUM-MAGNESUIUM-ZINC 333-133-8.3 MG Tabs   Stopped by:  Piter Fowler MD           GLUCOSAMINE PO   Stopped by:  Piter Fowler MD                Where to get your medicines      These medications were sent to [id number] [address]  [address] [address] [address]     Phone:  [phone]     simvastatin 20 MG tablet                Primary Care Provider Office Phone # Fax #    Taylor Villanueva [phone] [address] [address]        Equal Access to Services     [facility]: Hadii javier ku hadasho Soomaali, waaxda luqadaha, qaybta kaalmada aderichardyaemy, luis bojorquez . So [facility] [phone].    ATENCIÓN: Si habla español, tiene a patel disposición servicios gratuitos de asistencia lingüística. [facility] [phone].    We comply with applicable federal civil rights laws and [place] laws. We do not discriminate on the basis of race, color, national origin, age, disability, sex, sexual orientation, or gender identity.            Thank you!     Thank you for choosing [facility]  for your care. Our goal is always to provide you with excellent care. Hearing back from our patients is one way we can continue to improve our services. Please take a few minutes to complete the written survey that you may receive in the mail after your visit with us. Thank  you!             Your Updated Medication List - Protect others around you: Learn how to safely use, store and throw away your medicines at www.disposemymeds.org.          This list is accurate as of 10/23/18 11:59 PM.  Always use your most recent med list.                   Brand Name Dispense Instructions for use Diagnosis    esomeprazole 20 MG CR capsule    nexIUM    30 capsule    Take 1 capsule (20 mg) by mouth every morning (before breakfast) Take 30-60 minutes before eating.    Gastroesophageal reflux disease, esophagitis presence not specified       IRON (FERROUS SULFATE) TABS 325 MG OR      1 TABLET DAILY        simvastatin 20 MG tablet    ZOCOR    30 tablet    TAKE 1 TABLET (20 MG) BY MOUTH AT BEDTIME    Mixed hyperlipidemia       traZODone 100 MG tablet    DESYREL    180 tablet    Take 1 tablet (100 mg) by mouth nightly as needed for sleep        venlafaxine 150 MG Tb24 24 hr tablet    EFFEXOR-ER    180 tablet    TAKE 2 TABLETS (300 MG) BY MOUTH DAILY (WITH BREAKFAST)

## 2018-10-23 NOTE — PROGRESS NOTES
ASSESSMENT/PLAN:       1. Major depressive disorder, recurrent episode, in full remission (H)  Well controlled, continue effexor. Trazodone for insomnia    2. Gastroesophageal reflux disease without esophagitis  Suggested patient take daily prilosec, she will try  - T4 free  - T4 free    3. Subclinical hyperthyroidism  Labs indicate low TSH and normal T4.  She did not report any specific symptoms. We will monitor again in 6 months    4. Mixed hyperlipidemia  Continue zocor  - simvastatin (ZOCOR) 20 MG tablet; TAKE 1 TABLET (20 MG) BY MOUTH AT BEDTIME  Dispense: 30 tablet; Refill: 0  - Comprehensive metabolic panel  - Lipid panel reflex to direct LDL Fasting    5. Pneumococcal vaccination administered at current visit    - PNEUMOCOCCAL VACCINE,ADULT,SQ OR IM    6. Special screening for malignant neoplasms, colon  screen  - Fecal colorectal cancer screen (FIT); Future    7. Screening for thyroid disorder  above  - TSH with free T4 reflex      Health Maintenance    Immunizations : Prevnar 13 given last year, received 23 today  Colonoscopy :2008, patient does not want repeat colonoscopy. FIT screen today  Mammogram : is up to date  PHQ9/GAD7 :  Seeing another provider for this.     Piter Fowler MD  [facility]    SUBJECTIVE:   Alex Graham is a 68 year old female who presents to clinic today for the following health issues:      Hyperlipidemia Follow-Up      Rate your low fat/cholesterol diet?: not monitoring fat    Taking statin?  Yes, no muscle aches from statin    Other lipid medications/supplements?:  none    Amount of exercise or physical activity: None    Problems taking medications regularly: No    Medication side effects: none    Diet: low fat/cholesterol    Transferring from [facility]    She reports some GERD intermittently, tried nexium and this did help somewhat.   She some reflux, acid taste in mouth, sometimes she regurgitates.     History of depression  She has been  on effexor over 10 years  She takes trazadone at night as needed for sleep  Her mood is good. No SI  She sees Dr. Navya Hylton      Health Maintenance    Pre-Visit Screening :  Immunizations : Prevnar 13 given last year, received 23 today  Colonoscopy :2008, patient does not want repeat colonoscopy. FIT screen today  Mammogram : is up to date  PHQ9/GAD7 :  Seeing another provider for this.            Problem list and histories reviewed & adjusted, as indicated.  Additional history: as documented    Patient Active Problem List   Diagnosis     Family history of diabetes mellitus     Hyperlipidemia LDL goal <160     ACP (advance care planning)     Bipolar affective (H)     Health Care Home     H pylori ulcer     Major depressive disorder, recurrent episode, in full remission (H)     Past Surgical History:   Procedure Laterality Date     C NONSPECIFIC PROCEDURE      BREAST REDUCTION     COLONOSCOPY  12-15-08    normal  repeat in 10 years     DISKECTOMY, LUMBAR, SINGLE SP  10/00    L4-5     HC OPEN TREATMENT PROX HUMERAL FRACTURE  1/00       Social History   Substance Use Topics     Smoking status: Former Smoker     Packs/day: 0.50     Years: 4.00     Types: Cigarettes     Smokeless tobacco: Never Used      Comment: smoked as a teen     Alcohol use No      Comment: alcoholic in remission since 20105     Family History   Problem Relation Age of Onset     Thyroid Disease Mother      Diabetes Mother      Heart Failure Mother      C.A.D. Father      MI x4, stents, balloons, mitral regurgitation recently clipped     Ulcerative Colitis Sister      Emphysema Paternal Grandmother            Reviewed and updated as needed this visit by clinical staff  Tobacco  Allergies  Meds  Med Hx  Surg Hx  Fam Hx  Soc Hx      Reviewed and updated as needed this visit by Provider         ROS:  Constitutional, HEENT, cardiovascular, pulmonary, gi and gu systems are negative, except as otherwise noted.    OBJECTIVE:     /82  Temp 98.4   F (36.9  C) (Oral)  Resp 24  Wt 172 lb 9.6 oz (78.3 kg)  SpO2 99%  BMI 29.17 kg/m2  Body mass index is 29.17 kg/(m^2).  GENERAL: healthy, alert and no distress  RESP: lungs clear to auscultation - no rales, rhonchi or wheezes  CV: regular rate and rhythm, normal S1 S2, no S3 or S4, no murmur  ABDOMEN: soft, nontender, no hepatosplenomegaly, no masses and bowel sounds normal  MS: no gross musculoskeletal defects noted, no edema  Pysch:  Good affect    Diagnostic Test Results:  none

## 2018-10-23 NOTE — LETTER
October 25, 2018      Alex Graham  [address] [address]        Dear ,    We are writing to inform you of your test results.    Your bloodwork looks normal, your kidney function and liver function.    Your thyroid hormone test is normal (the screening thyroid hormone is slightly low but as long as the thyroid hormone is normal, we don't worry about this too much)    The total cholesterol level is good      Resulted Orders   Comprehensive metabolic panel   Result Value Ref Range    Sodium 139 133 - 144 mmol/L    Potassium 4.4 3.4 - 5.3 mmol/L    Chloride 105 94 - 109 mmol/L    Carbon Dioxide 23 20 - 32 mmol/L    Anion Gap 11 3 - 14 mmol/L    Glucose 102 (H) 70 - 99 mg/dL      Comment:      Fasting specimen    Urea Nitrogen 13 7 - 30 mg/dL    Creatinine 0.84 0.52 - 1.04 mg/dL    GFR Estimate 68 >60 mL/min/1.7m2      Comment:      Non  GFR Calc    GFR Estimate If Black 82 >60 mL/min/1.7m2      Comment:       GFR Calc    Calcium 8.9 8.5 - 10.1 mg/dL    Bilirubin Total 0.4 0.2 - 1.3 mg/dL    Albumin 3.7 3.4 - 5.0 g/dL    Protein Total 7.0 6.8 - 8.8 g/dL    Alkaline Phosphatase 65 40 - 150 U/L    ALT 33 0 - 50 U/L    AST 15 0 - 45 U/L   Lipid panel reflex to direct LDL Fasting   Result Value Ref Range    Cholesterol 172 <200 mg/dL    Triglycerides 92 <150 mg/dL      Comment:      Fasting specimen    HDL Cholesterol 54 >49 mg/dL    LDL Cholesterol Calculated 100 (H) <100 mg/dL      Comment:      Desirable:       <100 mg/dl    Non HDL Cholesterol 118 <130 mg/dL   TSH with free T4 reflex   Result Value Ref Range    TSH 0.10 (L) 0.40 - 4.00 mU/L   T4 free   Result Value Ref Range    T4 Free 0.92 0.76 - 1.46 ng/dL       If you have any questions or concerns, please call the clinic at the number listed above.       Sincerely,      Piter Fowler MD

## 2018-10-24 LAB
ALBUMIN SERPL-MCNC: 3.7 G/DL (ref 3.4–5)
ALP SERPL-CCNC: 65 U/L (ref 40–150)
ALT SERPL W P-5'-P-CCNC: 33 U/L (ref 0–50)
ANION GAP SERPL CALCULATED.3IONS-SCNC: 11 MMOL/L (ref 3–14)
AST SERPL W P-5'-P-CCNC: 15 U/L (ref 0–45)
BILIRUB SERPL-MCNC: 0.4 MG/DL (ref 0.2–1.3)
BUN SERPL-MCNC: 13 MG/DL (ref 7–30)
CALCIUM SERPL-MCNC: 8.9 MG/DL (ref 8.5–10.1)
CHLORIDE SERPL-SCNC: 105 MMOL/L (ref 94–109)
CHOLEST SERPL-MCNC: 172 MG/DL
CO2 SERPL-SCNC: 23 MMOL/L (ref 20–32)
CREAT SERPL-MCNC: 0.84 MG/DL (ref 0.52–1.04)
GFR SERPL CREATININE-BSD FRML MDRD: 68 ML/MIN/1.7M2
GLUCOSE SERPL-MCNC: 102 MG/DL (ref 70–99)
HDLC SERPL-MCNC: 54 MG/DL
LDLC SERPL CALC-MCNC: 100 MG/DL
NONHDLC SERPL-MCNC: 118 MG/DL
POTASSIUM SERPL-SCNC: 4.4 MMOL/L (ref 3.4–5.3)
PROT SERPL-MCNC: 7 G/DL (ref 6.8–8.8)
SODIUM SERPL-SCNC: 139 MMOL/L (ref 133–144)
T4 FREE SERPL-MCNC: 0.92 NG/DL (ref 0.76–1.46)
TRIGL SERPL-MCNC: 92 MG/DL
TSH SERPL DL<=0.005 MIU/L-ACNC: 0.1 MU/L (ref 0.4–4)

## 2018-10-26 PROBLEM — E05.90 SUBCLINICAL HYPERTHYROIDISM: Status: ACTIVE | Noted: 2018-10-26

## 2018-10-29 PROCEDURE — 82274 ASSAY TEST FOR BLOOD FECAL: CPT | Performed by: INTERNAL MEDICINE

## 2018-10-30 DIAGNOSIS — Z12.11 SPECIAL SCREENING FOR MALIGNANT NEOPLASMS, COLON: ICD-10-CM

## 2018-10-30 LAB — HEMOCCULT STL QL IA: NEGATIVE

## 2019-01-21 DIAGNOSIS — E78.2 MIXED HYPERLIPIDEMIA: ICD-10-CM

## 2019-01-21 NOTE — TELEPHONE ENCOUNTER
"Requested Prescriptions   Pending Prescriptions Disp Refills     simvastatin (ZOCOR) 20 MG tablet  Last Written Prescription Date:  10/23/18  Last Fill Quantity: 30,  # refills: 0   Last office visit: 10/23/2018 with prescribing provider:  10/23/18   Future Office Visit:     30 tablet 0     Sig: TAKE 1 TABLET (20 MG) BY MOUTH AT BEDTIME    Statins Protocol Passed - 1/21/2019 10:10 AM       Passed - LDL on file in past 12 months    Recent Labs   Lab Test 10/23/18  1206   *            Passed - No abnormal creatine kinase in past 12 months    No lab results found.            Passed - Recent (12 mo) or future (30 days) visit within the authorizing provider's specialty    Patient had office visit in the last 12 months or has a visit in the next 30 days with authorizing provider or within the authorizing provider's specialty.  See \"Patient Info\" tab in inbasket, or \"Choose Columns\" in Meds & Orders section of the refill encounter.             Passed - Medication is active on med list       Passed - Patient is age 18 or older       Passed - No active pregnancy on record       Passed - No positive pregnancy test in past 12 months          "

## 2019-01-23 RX ORDER — SIMVASTATIN 20 MG
TABLET ORAL
Qty: 90 TABLET | Refills: 1 | Status: SHIPPED | OUTPATIENT
Start: 2019-01-23 | End: 2019-08-08

## 2019-08-08 DIAGNOSIS — E78.2 MIXED HYPERLIPIDEMIA: ICD-10-CM

## 2019-08-08 RX ORDER — SIMVASTATIN 20 MG
TABLET ORAL
Qty: 90 TABLET | Refills: 0 | Status: SHIPPED | OUTPATIENT
Start: 2019-08-08 | End: 2019-11-30

## 2019-08-08 NOTE — TELEPHONE ENCOUNTER
"Requested Prescriptions   Pending Prescriptions Disp Refills     simvastatin (ZOCOR) 20 MG tablet 90 tablet 1     Sig: TAKE 1 TABLET (20 MG) BY MOUTH AT BEDTIME   Last Written Prescription Date:  01/23/2019  Last Fill Quantity: 90,  # refills: 01   Last office visit: 10/23/2018 with prescribing provider:     Future Office Visit:   Next 5 appointments (look out 90 days)    Sep 04, 2019 10:15 AM CDT  Office Visit with FRANCISCA Mayorga CNP  [facility] ([facility] [address]  [address]  [facility] [address]  [phone]           Statins Protocol Failed - 8/8/2019  9:33 AM        Failed - Recent (12 mo) or future (30 days) visit within the authorizing provider's specialty     Patient had office visit in the last 12 months or has a visit in the next 30 days with authorizing provider or within the authorizing provider's specialty.  See \"Patient Info\" tab in inbasket, or \"Choose Columns\" in Meds & Orders section of the refill encounter.              Passed - LDL on file in past 12 months     Recent Labs   Lab Test 10/23/18  1206   *             Passed - No abnormal creatine kinase in past 12 months     No lab results found.             Passed - Medication is active on med list        Passed - Patient is age 18 or older        Passed - No active pregnancy on record        Passed - No positive pregnancy test in past 12 months        "

## 2019-09-04 ENCOUNTER — OFFICE VISIT (OUTPATIENT)
Dept: FAMILY MEDICINE | Facility: CLINIC | Age: 69
End: 2019-09-04
Payer: COMMERCIAL

## 2019-09-04 DIAGNOSIS — Z71.84 TRAVEL ADVICE ENCOUNTER: Primary | ICD-10-CM

## 2019-09-04 PROCEDURE — 90471 IMMUNIZATION ADMIN: CPT | Mod: GA | Performed by: NURSE PRACTITIONER

## 2019-09-04 PROCEDURE — 36415 COLL VENOUS BLD VENIPUNCTURE: CPT | Mod: GA | Performed by: NURSE PRACTITIONER

## 2019-09-04 PROCEDURE — 90717 YELLOW FEVER VACCINE SUBQ: CPT | Mod: GA | Performed by: NURSE PRACTITIONER

## 2019-09-04 PROCEDURE — 86762 RUBELLA ANTIBODY: CPT | Performed by: NURSE PRACTITIONER

## 2019-09-04 PROCEDURE — 86765 RUBEOLA ANTIBODY: CPT | Performed by: NURSE PRACTITIONER

## 2019-09-04 PROCEDURE — 90734 MENACWYD/MENACWYCRM VACC IM: CPT | Mod: GA | Performed by: NURSE PRACTITIONER

## 2019-09-04 PROCEDURE — 99402 PREV MED CNSL INDIV APPRX 30: CPT | Mod: 25 | Performed by: NURSE PRACTITIONER

## 2019-09-04 PROCEDURE — 86735 MUMPS ANTIBODY: CPT | Performed by: NURSE PRACTITIONER

## 2019-09-04 PROCEDURE — 90472 IMMUNIZATION ADMIN EACH ADD: CPT | Mod: GA | Performed by: NURSE PRACTITIONER

## 2019-09-04 PROCEDURE — 90636 HEP A/HEP B VACC ADULT IM: CPT | Mod: GA | Performed by: NURSE PRACTITIONER

## 2019-09-04 RX ORDER — AZITHROMYCIN 500 MG/1
500 TABLET, FILM COATED ORAL DAILY
Qty: 3 TABLET | Refills: 0 | Status: SHIPPED | OUTPATIENT
Start: 2019-09-04 | End: 2020-01-22

## 2019-09-04 RX ORDER — ATOVAQUONE AND PROGUANIL HYDROCHLORIDE 250; 100 MG/1; MG/1
1 TABLET, FILM COATED ORAL DAILY
Qty: 20 TABLET | Refills: 0 | Status: SHIPPED | OUTPATIENT
Start: 2019-09-04 | End: 2020-01-22

## 2019-09-04 NOTE — PATIENT INSTRUCTIONS
Today September 4, 2019 you received the    Twinrix (Hepatitis A & B combo) Vaccine - Please return on 10/4/19 for your 2nd dose and 3/2/20 or later for your 3rd and final dose.    Yellow Fever (YF)    Meningococcal (Menactra) Vaccine      10/04/2019 or later  Tdap.    These appointments can be made as a NURSE ONLY visit.    **It is very important for the vaccinations to be given on the scheduled day(s), this helps ensure you receive the full effectiveness of the vaccine.**    Please call [facility] with any questions [phone]    Thank you for visiting [facility]

## 2019-09-04 NOTE — LETTER
September 5, 2019      Alex Graham  [address]  [place] [address]        Dear ,    We are writing to inform you of your test results.    Your blood test shows that you do not need the MMR vaccine.    Resulted Orders   Rubeola Antibody IgG   Result Value Ref Range    Rubeola (Measles) Antibody IgG 6.7 (H) 0.0 - 0.8 AI      Comment:      Positive, suggests prev. exposure and probable immunity  Antibody index (AI) values reflect qualitative changes in antibody   concentration that cannot be directly associated with clinical condition or   disease state.     Rubella Antibody IgG Quantitative   Result Value Ref Range    Rubella Antibody IgG Quantitative >250 IU/mL      Comment:      Positive.  Suggests previous exposure or immunization and probable immunity  Reference Range:    Unvaccinated Negative 0-7 IU/mL  Vaccinated or previous exposure Positive 10 IU/ml or greater     Mumps Immune Status, IgG   Result Value Ref Range    Mumps Antibody IgG 1.6 (H) 0.0 - 0.8 AI      Comment:      Positive, suggests prev. exposure and probable immunity  Antibody index (AI) values reflect qualitative changes in antibody   concentration that cannot be directly associated with clinical condition or   disease state.         If you have any questions or concerns, please call the clinic at the number listed above.       Sincerely,        FRANCISCA Mayorga CNP

## 2019-09-04 NOTE — NURSING NOTE
Prior to immunization administration, verified patients identity using patient s name and date of birth. Please see Immunization Activity for additional information.     Screening Questionnaire for Adult Immunization    Are you sick today?   No   Do you have allergies to medications, food, a vaccine component or latex?   No   Have you ever had a serious reaction after receiving a vaccination?   No   Do you have a long-term health problem with heart disease, lung disease, asthma, kidney disease, metabolic disease (e.g. diabetes), anemia, or other blood disorder?   No   Do you have cancer, leukemia, HIV/AIDS, or any other immune system problem?   No   In the past 3 months, have you taken medications that affect  your immune system, such as prednisone, other steroids, or anticancer drugs; drugs for the treatment of rheumatoid arthritis, Crohn s disease, or psoriasis; or have you had radiation treatments?   No   Have you had a seizure, or a brain or other nervous system problem?   No   During the past year, have you received a transfusion of blood or blood     products, or been given immune (gamma) globulin or antiviral drug?   No   For women: Are you pregnant or is there a chance you could become        pregnant during the next month?   No   Have you received any vaccinations in the past 4 weeks?   No     Immunization questionnaire answers were all negative.        Per orders of ASAEL Rea, injection of YF, Menactra and Twinrix  given by Kimberly Benitez CMA. Patient instructed to remain in clinic for 15 minutes afterwards, and to report any adverse reaction to me immediately.       Screening performed by Kimberly Benitez CMA on 9/4/2019 at 11:22 AM.

## 2019-09-04 NOTE — PROGRESS NOTES
Nurse Note      Itinerary:  [facility]      Departure Date: 11/29/2019      Return Date: 12/09/2019      Length of Trip 2 weeks      Reason for Travel: [place] work           Urban or rural: both      Accommodations: Hotel        IMMUNIZATION HISTORY  Have you received any immunizations within the past 4 weeks?  No  Have you ever fainted from having your blood drawn or from an injection?  No  Have you ever had a fever reaction to vaccination?  No  Have you ever had any bad reaction or side effect from any vaccination?  No  Have you ever had hepatitis A or B vaccine?  No  Do you live (or work closely) with anyone who has AIDS, an AIDS-like condition, any other immune disorder or who is on chemotherapy for cancer?  No  Do you have a family history of immunodeficiency?  No  Have you received any injection of immune globulin or any blood products during the past 12 months?  No    Patient roomed by All.OSMAN Pearson  Alex Graham is a 69 year old female seen today with spouse for counsultation for international travel to the stated countries.   Patient will be departing in  2.5 month(s) and  traveling with spouse  with [employer].      Patient itinerary :  will be in the [facility]  region of [facility] which presents risk for Malaria and Yellow Fever. exposure.      Patient's activities will include volunteer work.    Patient's country of birth is [place]    Special medical concerns: Anxiety/depression  Pre-travel questionnaire was completed by patient and reviewed by provider.     Vitals: There were no vitals taken for this visit.  BMI= There is no height or weight on file to calculate BMI.    EXAM:  General:  Well-nourished, well-developed in no acute distress.  Appears to be stated age, interacts appropriately and expresses understanding of information given to patient.    Current Outpatient Medications   Medication Sig Dispense Refill     IRON (FERROUS SULFATE) TABS 325 MG OR 1 TABLET DAILY       simvastatin  (ZOCOR) 20 MG tablet TAKE 1 TABLET (20 MG) BY MOUTH AT BEDTIME 90 tablet 0     traZODone (DESYREL) 100 MG tablet Take 1 tablet (100 mg) by mouth nightly as needed for sleep 180 tablet 1     venlafaxine (EFFEXOR-ER) 150 MG TB24 TAKE 2 TABLETS (300 MG) BY MOUTH DAILY (WITH BREAKFAST) 180 tablet 1     Patient Active Problem List   Diagnosis     Family history of diabetes mellitus     Hyperlipidemia LDL goal <160     ACP (advance care planning)     Bipolar affective (H)     Health Care Home     H pylori ulcer     Major depressive disorder, recurrent episode, in full remission (H)     Subclinical hyperthyroidism     Allergies   Allergen Reactions     No Known Drug Allergies          Immunizations discussed include:   Hepatitis A:  Twin Clara series started today  Hepatitis B: Twin Clara series started today  Influenza: vaccine is not available  Typhoid: Oral Typhoid (Vivotiff) Rx sent to pharmacy  Rabies: Declined  reviewed managment of a animal bite or scratch (washing wound, seek medical care within 24 hours for post exposure prophylaxis )  Yellow Fever: Stamaril Ordered/given today - consent completed, side effects, precautions, allergies, risks discussed. Patient expressed understanding.  Turkish Encephalitis: Not indicated  Meningococcus: Ordered/given today, risks, benefits and side effects reviewed  Tetanus/Diphtheria: future order placed for next month  Measles/Mumps/Rubella: Titers drawn  Cholera: Not needed  Polio: Up to date  Pneumococcal: Up to date  Varicella: Immune by disease history per patient report  Zostavax:  Up to date  Shingrix: deferred  HPV:  Not indicated  TB:  Low risk     Stamaril Informed Consent    The patient was provided with a copy of the IRB-approved consent form and all questions were answered before the patient agreed to participate by signing the informed consent document.   A copy of the form was provided to the patient.    Date: September 4, 2019   Consent Version Date: /xx   Consent  Obtained by:  Jenna Rea CNP (Lori)     HIPAA:  Yes  HIPAA Authorization Signed Date: September 4, 2019     Inclusion/Exclusion Criteria:    (Similar to Yellow Fever-VAX)      The patient met all of the following inclusion criteria in order to be eligible for the Stamaril vaccination under this EAP (Expanded Access Investigational New Drug Program)           At increased risk for YF, including researchers, laboratory workers, vaccine production staff, and those who are traveling within 30 days to a YF-endemic region or to a country requiring proof of YF vaccination under IHRs (International Health Regulations)?       Yes     Patient is greater than or equal to 9 months of age on the day of vaccination?     Yes     Patient is greater than or equal to 18 years of age and signed and dated the Consent Forms?     Yes     Patient is < 18 years of age and parent(s)/guardian(s) signed and dated the Consent Forms?      Patient is 7 years to < 18 years of age and signed and dated the Assent form?        No Assent is required.  Patient is <7 years of age.     No      No      N/A     The patient did not meet any of the following criteria that would have excluded the patient from receiving the Stamaril vaccination under this EAP              Patient is less than 9 months of age.       No     The patient is breast-feeding and cannot stop nursing for at least 14 days after vaccination.    Note: Yellow Fever vaccine virus may be transmissible via breast milk by nursing mothers who are vaccinated during the final 2 weeks of pregnancy or post-partum.   Following transmission, infants may develop encephalitis.  The minimum time of discontinuation of breastfeeding for 14 days after vaccination is based on the expected clearance of live-attenuated vaccine virus.       No     The patient is immunosuppressed, whether congenital or idiopathic, including for example, leukemia, lymphoma, other malignancies, and patients who are receiving  immunosuppressant medications (e.g. Systemic corticosteroids [greater than the standard dose of topical or inhaled steroids], alkylating drugs, antimetabolites, of other cytotoxic or immunomodulatory drugs) or radiation therapy or organ transplantation.       No     The patient has known hypersensitivity to the active substance or to any of the excipients of Stamaril vaccine or to eggs or chicken proteins.     No     The patient is symptomatic for human immunodeficiency virus (HIV) infection     No     The patient is asymptomatic for HIV infection but accompanied by evidence of severe immune suppression    Note:  Evidence of severe immune suppression includes CD4+ T-cell counts < 200 cubic millimeters (or < 15% total lymphocytes in children aged < 6 years), or as determined by the health care provider.       No     The patient has a history of thymus dysfunction (including myasthenia gravis, thymoma, thymectomy)     No     Moderate or severe febrile illness or acute illness    Note: Participation in the EAP can be reassessed when moderate or severe febrile illness or acute illness has resolved.       No           Altitude Exposure on this trip: no  Past tolerance to Altitude: na    ASSESSMENT/PLAN:    ICD-10-CM    1. Travel advice encounter Z71.89 typhoid (VIVOTIF) CR capsule     Rubeola Antibody IgG     Rubella Antibody IgG Quantitative     Mumps Immune Status, IgG     atovaquone-proguanil (MALARONE) 250-100 MG tablet     azithromycin (ZITHROMAX) 500 MG tablet     Mumps Immune Status, IgG     I have reviewed general recommendations for safe travel   including: food/water precautions, insect precautions, safer sex   practices given high prevalence of Zika, HIV and other STDs,   roadway safety. Educational materials and Travax report provided.    Malaraia prophylaxis recommended: Malarone  Symptomatic treatment for traveler's diarrhea: azithromycin  Altitude illness prevention and treatment: no      Evacuation  insurance advised and resources were provided to patient.    Total visit time 30 minutes  with over 50% of time spent counseling patient as detailed above.    Jenna Rea CNP

## 2019-09-05 ENCOUNTER — TELEPHONE (OUTPATIENT)
Dept: FAMILY MEDICINE | Facility: CLINIC | Age: 69
End: 2019-09-05

## 2019-09-05 LAB
MEV IGG SER QL IA: 6.7 AI (ref 0–0.8)
MUV IGG SER QL IA: 1.6 AI (ref 0–0.8)
RUBV IGG SERPL IA-ACNC: >250 IU/ML

## 2019-09-05 NOTE — TELEPHONE ENCOUNTER
Prior Authorization Retail Medication Request    Medication/Dose: typhoid (VIVOTIF) CR capsule  ICD code (if different than what is on RX):    Previously Tried and Failed:    Rationale:      Insurance Name:  [employer] [phone]  Insurance ID:  [id number]      Pharmacy Information (if different than what is on RX)  Name:  [facility] [address]  Phone:  [phone]

## 2019-09-11 NOTE — TELEPHONE ENCOUNTER
Central Prior Authorization Team   Phone: [phone]    PA Initiation    Medication: typhoid (VIVOTIF) CR capsule  Insurance Company: [employer] - Phone [phone] Fax [phone]  Pharmacy Filling the Rx: [id number] - [place], [place] - [address] [address]  Filling Pharmacy Phone: [phone]  Filling Pharmacy Fax:    Start Date: 9/11/2019

## 2019-09-11 NOTE — TELEPHONE ENCOUNTER
Central Prior Authorization Team   Phone: [phone]    PRIOR AUTHORIZATION DENIED    Medication: typhoid (VIVOTIF) CR capsule    Denial Date: 9/11/2019    Denial Rational:  of medication didn't sign agreement to participate in the Medicare Coverage Gap Discount Program, therefore medication is not covered by [employer] (which follows Medicare rules).          Appeal Information: N/A- medication is an excluded medication.

## 2019-10-04 ENCOUNTER — ALLIED HEALTH/NURSE VISIT (OUTPATIENT)
Dept: NURSING | Facility: CLINIC | Age: 69
End: 2019-10-04
Payer: COMMERCIAL

## 2019-10-04 DIAGNOSIS — Z23 NEED FOR VACCINATION WITH TWINRIX: Primary | ICD-10-CM

## 2019-10-04 PROCEDURE — 99207 ZZC NO CHARGE NURSE ONLY: CPT

## 2019-10-04 PROCEDURE — 90636 HEP A/HEP B VACC ADULT IM: CPT

## 2019-10-04 PROCEDURE — 90471 IMMUNIZATION ADMIN: CPT

## 2019-11-30 DIAGNOSIS — E78.2 MIXED HYPERLIPIDEMIA: ICD-10-CM

## 2019-12-02 NOTE — TELEPHONE ENCOUNTER
"Requested Prescriptions   Pending Prescriptions Disp Refills     simvastatin (ZOCOR) 20 MG tablet [Pharmacy Med Name: Simvastatin Oral Tablet  Last Written Prescription Date:  8/8/2019  Last Fill Quantity: 90,  # refills: 0   Last office visit: 9/4/2019 with prescribing provider:     Future Office Visit:   20 MG] 90 tablet 0     Sig: TAKE 1 TABLET (20 MG) BY MOUTH AT BEDTIME       Statins Protocol Failed - 11/30/2019  2:40 PM        Failed - LDL on file in past 12 months     Recent Labs   Lab Test 10/23/18  1206   *             Failed - Recent (12 mo) or future (30 days) visit within the authorizing provider's specialty     Patient has had an office visit with the authorizing provider or a provider within the authorizing providers department within the previous 12 mos or has a future within next 30 days. See \"Patient Info\" tab in inbasket, or \"Choose Columns\" in Meds & Orders section of the refill encounter.              Passed - No abnormal creatine kinase in past 12 months     No lab results found.             Passed - Medication is active on med list        Passed - Patient is age 18 or older        Passed - No active pregnancy on record        Passed - No positive pregnancy test in past 12 months        "

## 2019-12-03 NOTE — TELEPHONE ENCOUNTER
Last appointment at [facility] was with Dr. Fowler on 10/23/18. Needs to schedule appointment with new provider.    Attempted to contact patient. Left voice message to call back.

## 2019-12-11 RX ORDER — SIMVASTATIN 20 MG
TABLET ORAL
Qty: 15 TABLET | Refills: 0 | Status: SHIPPED | OUTPATIENT
Start: 2019-12-11 | End: 2020-01-22

## 2019-12-11 NOTE — TELEPHONE ENCOUNTER
Patient has not been seen in over a year. Called x3. No response, no future appointments scheduled. Please advise on refill.

## 2019-12-13 NOTE — TELEPHONE ENCOUNTER
Patient returned call, will not be making an appointment until after the first of the year. Patient states is satisfied with refill she received from her pharmacy for this medication.

## 2020-01-22 ENCOUNTER — OFFICE VISIT (OUTPATIENT)
Dept: INTERNAL MEDICINE | Facility: CLINIC | Age: 70
End: 2020-01-22
Payer: COMMERCIAL

## 2020-01-22 ENCOUNTER — MEDICAL CORRESPONDENCE (OUTPATIENT)
Dept: HEALTH INFORMATION MANAGEMENT | Facility: CLINIC | Age: 70
End: 2020-01-22

## 2020-01-22 VITALS
TEMPERATURE: 98.4 F | BODY MASS INDEX: 28.09 KG/M2 | WEIGHT: 168.6 LBS | RESPIRATION RATE: 16 BRPM | HEART RATE: 102 BPM | HEIGHT: 65 IN | SYSTOLIC BLOOD PRESSURE: 110 MMHG | DIASTOLIC BLOOD PRESSURE: 84 MMHG | OXYGEN SATURATION: 96 %

## 2020-01-22 DIAGNOSIS — Z00.00 HEALTH CARE MAINTENANCE: Primary | ICD-10-CM

## 2020-01-22 DIAGNOSIS — E78.2 MIXED HYPERLIPIDEMIA: ICD-10-CM

## 2020-01-22 LAB
ERYTHROCYTE [DISTWIDTH] IN BLOOD BY AUTOMATED COUNT: 12.8 % (ref 10–15)
HCT VFR BLD AUTO: 44.8 % (ref 35–47)
HGB BLD-MCNC: 14.3 G/DL (ref 11.7–15.7)
MCH RBC QN AUTO: 28 PG (ref 26.5–33)
MCHC RBC AUTO-ENTMCNC: 31.9 G/DL (ref 31.5–36.5)
MCV RBC AUTO: 88 FL (ref 78–100)
PLATELET # BLD AUTO: 275 10E9/L (ref 150–450)
RBC # BLD AUTO: 5.11 10E12/L (ref 3.8–5.2)
WBC # BLD AUTO: 6.3 10E9/L (ref 4–11)

## 2020-01-22 PROCEDURE — 99387 INIT PM E/M NEW PAT 65+ YRS: CPT | Performed by: NURSE PRACTITIONER

## 2020-01-22 PROCEDURE — 36415 COLL VENOUS BLD VENIPUNCTURE: CPT | Performed by: NURSE PRACTITIONER

## 2020-01-22 PROCEDURE — 85027 COMPLETE CBC AUTOMATED: CPT | Performed by: NURSE PRACTITIONER

## 2020-01-22 PROCEDURE — 80053 COMPREHEN METABOLIC PANEL: CPT | Performed by: NURSE PRACTITIONER

## 2020-01-22 PROCEDURE — 84443 ASSAY THYROID STIM HORMONE: CPT | Performed by: NURSE PRACTITIONER

## 2020-01-22 PROCEDURE — 80061 LIPID PANEL: CPT | Performed by: NURSE PRACTITIONER

## 2020-01-22 RX ORDER — MULTIPLE VITAMINS W/ MINERALS TAB 9MG-400MCG
1 TAB ORAL DAILY
COMMUNITY

## 2020-01-22 RX ORDER — SIMVASTATIN 20 MG
TABLET ORAL
Qty: 90 TABLET | Refills: 3 | Status: SHIPPED | OUTPATIENT
Start: 2020-01-22 | End: 2020-12-17

## 2020-01-22 ASSESSMENT — ENCOUNTER SYMPTOMS
ABDOMINAL PAIN: 0
CONSTIPATION: 0
HEMATOCHEZIA: 0
FEVER: 0
COUGH: 0
CHILLS: 0
DIARRHEA: 0
NERVOUS/ANXIOUS: 0
EYE PAIN: 0
DIZZINESS: 0
HEMATURIA: 0

## 2020-01-22 ASSESSMENT — MIFFLIN-ST. JEOR: SCORE: 1292.7

## 2020-01-22 ASSESSMENT — ACTIVITIES OF DAILY LIVING (ADL): CURRENT_FUNCTION: NO ASSISTANCE NEEDED

## 2020-01-22 NOTE — LETTER
January 23, 2020      Alex Graham  [address] [facility] [address]        Dear ,    We are writing to inform you of your test results.    Your recent lab results were normal.    Resulted Orders   CBC with platelets   Result Value Ref Range    WBC 6.3 4.0 - 11.0 10e9/L    RBC Count 5.11 3.8 - 5.2 10e12/L    Hemoglobin 14.3 11.7 - 15.7 g/dL    Hematocrit 44.8 35.0 - 47.0 %    MCV 88 78 - 100 fl    MCH 28.0 26.5 - 33.0 pg    MCHC 31.9 31.5 - 36.5 g/dL    RDW 12.8 10.0 - 15.0 %    Platelet Count 275 150 - 450 10e9/L   Comprehensive metabolic panel   Result Value Ref Range    Sodium 137 133 - 144 mmol/L    Potassium 4.3 3.4 - 5.3 mmol/L    Chloride 104 94 - 109 mmol/L    Carbon Dioxide 24 20 - 32 mmol/L    Anion Gap 9 3 - 14 mmol/L    Glucose 90 70 - 99 mg/dL      Comment:      Fasting specimen    Urea Nitrogen 11 7 - 30 mg/dL    Creatinine 0.81 0.52 - 1.04 mg/dL    GFR Estimate 75 >60 mL/min/[1.73_m2]      Comment:      Non  GFR Calc  Starting 12/18/2018, serum creatinine based estimated GFR (eGFR) will be   calculated using the Chronic Kidney Disease Epidemiology Collaboration   (CKD-EPI) equation.      GFR Estimate If Black 87 >60 mL/min/[1.73_m2]      Comment:       GFR Calc  Starting 12/18/2018, serum creatinine based estimated GFR (eGFR) will be   calculated using the Chronic Kidney Disease Epidemiology Collaboration   (CKD-EPI) equation.      Calcium 8.7 8.5 - 10.1 mg/dL    Bilirubin Total 0.3 0.2 - 1.3 mg/dL    Albumin 3.8 3.4 - 5.0 g/dL    Protein Total 7.0 6.8 - 8.8 g/dL    Alkaline Phosphatase 61 40 - 150 U/L    ALT 22 0 - 50 U/L    AST 14 0 - 45 U/L   Lipid Profile   Result Value Ref Range    Cholesterol 138 <200 mg/dL    Triglycerides 85 <150 mg/dL      Comment:      Fasting specimen    HDL Cholesterol 51 >49 mg/dL    LDL Cholesterol Calculated 70 <100 mg/dL      Comment:      Desirable:       <100 mg/dl    Non HDL Cholesterol 87 <130 mg/dL   TSH with free  T4 reflex   Result Value Ref Range    TSH 2.87 0.40 - 4.00 mU/L       If you have any questions or concerns, please call the clinic at the number listed above.       Sincerely,        Macy Carrillo NP

## 2020-01-22 NOTE — NURSING NOTE
"Patient here for a wellness visit and is fasting.  /84 (BP Location: Right arm, Patient Position: Sitting, Cuff Size: Adult Regular)   Pulse 102   Temp 98.4  F (36.9  C) (Oral)   Resp 16   Ht 1.638 m (5' 4.5\")   Wt 76.5 kg (168 lb 9.6 oz)   LMP  (LMP Unknown)   SpO2 96%   BMI 28.49 kg/m      "

## 2020-01-22 NOTE — PROGRESS NOTES
"SUBJECTIVE:   Alex Graham is a 69 year old female who presents for Preventive Visit.      Are you in the first 12 months of your Medicare coverage?  No    Healthy Habits:     In general, how would you rate your overall health?  Excellent    Frequency of exercise:  None    Do you usually eat at least 4 servings of fruit and vegetables a day, include whole grains    & fiber and avoid regularly eating high fat or \"junk\" foods?  No    Taking medications regularly:  Yes    Medication side effects:  None    Ability to successfully perform activities of daily living:  No assistance needed    Home Safety:  No safety concerns identified    Hearing Impairment:  No hearing concerns    In the past 6 months, have you been bothered by leaking of urine?  No    In general, how would you rate your overall mental or emotional health?  Excellent      PHQ-2 Total Score: 0    Additional concerns today:  No    Do you feel safe in your environment? Yes    Have you ever done Advance Care Planning? (For example, a Health Directive, POLST, or a discussion with a medical provider or your loved ones about your wishes): Yes, patient states has an Advance Care Planning document and will bring a copy to the clinic.      Fall risk       Cognitive Screening   1) Repeat 3 items (Leader, Season, Table)    2) Clock draw: NORMAL  3) 3 item recall: Recalls 3 objects  Results: 3 items recalled: COGNITIVE IMPAIRMENT LESS LIKELY    Mini-CogTM Copyright S Stefania. Licensed by the author for use in [facility]; reprinted with permission (timothy@.[email or web address]). All rights reserved.      Do you have sleep apnea, excessive snoring or daytime drowsiness?: no    Reviewed and updated as needed this visit by clinical staff  Tobacco  Allergies  Meds  Med Hx  Surg Hx  Fam Hx  Soc Hx        Reviewed and updated as needed this visit by Provider        Social History     Tobacco Use     Smoking status: Former Smoker     Packs/day: 0.50     Years: 4.00     " Pack years: 2.00     Types: Cigarettes     Smokeless tobacco: Never Used     Tobacco comment: smoked as a teen   Substance Use Topics     Alcohol use: No     Alcohol/week: 0.0 standard drinks     Comment: alcoholic in remission since 20105     If you drink alcohol do you typically have >3 drinks per day or >7 drinks per week? No    Alcohol Use 1/22/2020   Prescreen: >3 drinks/day or >7 drinks/week? Not Applicable               Current providers sharing in care for this patient include:   Patient Care Team:  Taylor Villanueva MD as PCP - General (Family Practice)  Taylor Villanueva MD as Assigned PCP    The following health maintenance items are reviewed in Epic and correct as of today:  Health Maintenance   Topic Date Due     ZOSTER IMMUNIZATION (2 of 3) 01/25/2013     MEDICARE ANNUAL WELLNESS VISIT  01/10/2018     COLONOSCOPY  12/15/2018     FALL RISK ASSESSMENT  10/23/2019     MAMMO SCREENING  12/22/2019     DTAP/TDAP/TD IMMUNIZATION (2 - Td) 10/19/2020     ADVANCE CARE PLANNING  11/18/2020     LIPID  10/23/2023     DEXA  Completed     HEPATITIS C SCREENING  Completed     INFLUENZA VACCINE  Completed     PNEUMOCOCCAL IMMUNIZATION 65+ LOW/MEDIUM RISK  Completed     IPV IMMUNIZATION  Aged Out     MENINGITIS IMMUNIZATION  Aged Out     BP Readings from Last 3 Encounters:   01/22/20 110/84   10/23/18 112/82   09/27/17 114/82    Wt Readings from Last 3 Encounters:   01/22/20 76.5 kg (168 lb 9.6 oz)   10/23/18 78.3 kg (172 lb 9.6 oz)   09/27/17 74.9 kg (165 lb 3.2 oz)                  Patient Active Problem List   Diagnosis     Family history of diabetes mellitus     Hyperlipidemia LDL goal <160     ACP (advance care planning)     Bipolar affective (H)     Health Care Home     H pylori ulcer     Major depressive disorder, recurrent episode, in full remission (H)     Subclinical hyperthyroidism     Past Surgical History:   Procedure Laterality Date     C NONSPECIFIC PROCEDURE      BREAST REDUCTION     COLONOSCOPY   12-15-08    normal  repeat in 10 years     DISKECTOMY, LUMBAR, SINGLE SP  10/00    L4-5     HC OPEN TREATMENT PROX HUMERAL FRACTURE  1/00       Social History     Tobacco Use     Smoking status: Former Smoker     Packs/day: 0.50     Years: 4.00     Pack years: 2.00     Types: Cigarettes     Smokeless tobacco: Never Used     Tobacco comment: smoked as a teen   Substance Use Topics     Alcohol use: No     Alcohol/week: 0.0 standard drinks     Comment: alcoholic in remission since 20105     Family History   Problem Relation Age of Onset     Thyroid Disease Mother      Diabetes Mother      Heart Failure Mother      C.A.D. Father         MI x4, stents, balloons, mitral regurgitation recently clipped     Ulcerative Colitis Sister      Emphysema Paternal Grandmother          Current Outpatient Medications   Medication Sig Dispense Refill     IRON (FERROUS SULFATE) TABS 325 MG OR 1 TABLET DAILY       magnesium oxide 200 MG TABS Take 400 mg by mouth       multivitamin w/minerals (MULTI-VITAMIN) tablet Take 1 tablet by mouth daily       simvastatin (ZOCOR) 20 MG tablet 1 po daily 90 tablet 3     traZODone (DESYREL) 100 MG tablet Take 1 tablet (100 mg) by mouth nightly as needed for sleep 180 tablet 1     venlafaxine (EFFEXOR-ER) 150 MG TB24 TAKE 2 TABLETS (300 MG) BY MOUTH DAILY (WITH BREAKFAST) 180 tablet 1         Review of Systems   Constitutional: Negative for chills and fever.   HENT: Negative for congestion and ear pain.    Eyes: Negative for pain.   Respiratory: Negative for cough.    Cardiovascular: Negative for chest pain.   Gastrointestinal: Negative for abdominal pain, constipation, diarrhea and hematochezia.   Genitourinary: Negative for hematuria.   Neurological: Negative for dizziness.   Psychiatric/Behavioral: The patient is not nervous/anxious.          OBJECTIVE:   /84 (BP Location: Right arm, Patient Position: Sitting, Cuff Size: Adult Regular)   Pulse 102   Temp 98.4  F (36.9  C) (Oral)   Resp 16   " Ht 1.638 m (5' 4.5\")   Wt 76.5 kg (168 lb 9.6 oz)   LMP  (LMP Unknown)   SpO2 96%   BMI 28.49 kg/m   Estimated body mass index is 28.49 kg/m  as calculated from the following:    Height as of this encounter: 1.638 m (5' 4.5\").    Weight as of this encounter: 76.5 kg (168 lb 9.6 oz).  Physical Exam  GENERAL: healthy, alert and no distress  NECK: no adenopathy, no asymmetry, masses, or scars and thyroid normal to palpation  RESP: lungs clear to auscultation - no rales, rhonchi or wheezes  CV: regular rate and rhythm, normal S1 S2, no S3 or S4, no murmur, click or rub, no peripheral edema and peripheral pulses strong  ABDOMEN: soft, nontender, no hepatosplenomegaly, no masses and bowel sounds normal  MS: no gross musculoskeletal defects noted, no edema  NEURO: Normal strength and tone, mentation intact and speech normal  PSYCH: mentation appears normal, affect normal/bright        ASSESSMENT / PLAN:       ICD-10-CM    1. Health care maintenance Z00.00 CBC with platelets     Comprehensive metabolic panel     Lipid Profile     TSH with free T4 reflex   2. Mixed hyperlipidemia E78.2 simvastatin (ZOCOR) 20 MG tablet       COUNSELING:  Reviewed preventive health counseling, as reflected in patient instructions    Estimated body mass index is 28.49 kg/m  as calculated from the following:    Height as of this encounter: 1.638 m (5' 4.5\").    Weight as of this encounter: 76.5 kg (168 lb 9.6 oz).         reports that she has quit smoking. Her smoking use included cigarettes. She has a 2.00 pack-year smoking history. She has never used smokeless tobacco.      Appropriate preventive services were discussed with this patient, including applicable screening as appropriate for cardiovascular disease, diabetes, osteopenia/osteoporosis, and glaucoma.  As appropriate for age/gender, discussed screening for colorectal cancer, prostate cancer, breast cancer, and cervical cancer. Checklist reviewing preventive services available has " been given to the patient.    Reviewed patients plan of care and provided an AVS. The Basic Care Plan (routine screening as documented in Health Maintenance) for Alex meets the Care Plan requirement. This Care Plan has been established and reviewed with the Patient.    Counseling Resources:  ATP IV Guidelines  Pooled Cohorts Equation Calculator  Breast Cancer Risk Calculator  FRAX Risk Assessment  ICSI Preventive Guidelines  Dietary Guidelines for Americans, 2010  Spectrum[employer]'s MyPlate  ASA Prophylaxis  Lung CA Screening    Macy Carrillo NP  [facility]    Identified Health Risks:

## 2020-01-23 LAB
ALBUMIN SERPL-MCNC: 3.8 G/DL (ref 3.4–5)
ALP SERPL-CCNC: 61 U/L (ref 40–150)
ALT SERPL W P-5'-P-CCNC: 22 U/L (ref 0–50)
ANION GAP SERPL CALCULATED.3IONS-SCNC: 9 MMOL/L (ref 3–14)
AST SERPL W P-5'-P-CCNC: 14 U/L (ref 0–45)
BILIRUB SERPL-MCNC: 0.3 MG/DL (ref 0.2–1.3)
BUN SERPL-MCNC: 11 MG/DL (ref 7–30)
CALCIUM SERPL-MCNC: 8.7 MG/DL (ref 8.5–10.1)
CHLORIDE SERPL-SCNC: 104 MMOL/L (ref 94–109)
CHOLEST SERPL-MCNC: 138 MG/DL
CO2 SERPL-SCNC: 24 MMOL/L (ref 20–32)
CREAT SERPL-MCNC: 0.81 MG/DL (ref 0.52–1.04)
GFR SERPL CREATININE-BSD FRML MDRD: 75 ML/MIN/{1.73_M2}
GLUCOSE SERPL-MCNC: 90 MG/DL (ref 70–99)
HDLC SERPL-MCNC: 51 MG/DL
LDLC SERPL CALC-MCNC: 70 MG/DL
NONHDLC SERPL-MCNC: 87 MG/DL
POTASSIUM SERPL-SCNC: 4.3 MMOL/L (ref 3.4–5.3)
PROT SERPL-MCNC: 7 G/DL (ref 6.8–8.8)
SODIUM SERPL-SCNC: 137 MMOL/L (ref 133–144)
TRIGL SERPL-MCNC: 85 MG/DL
TSH SERPL DL<=0.005 MIU/L-ACNC: 2.87 MU/L (ref 0.4–4)

## 2020-01-28 ENCOUNTER — TRANSFERRED RECORDS (OUTPATIENT)
Dept: HEALTH INFORMATION MANAGEMENT | Facility: CLINIC | Age: 70
End: 2020-01-28

## 2020-02-20 ENCOUNTER — TRANSFERRED RECORDS (OUTPATIENT)
Dept: HEALTH INFORMATION MANAGEMENT | Facility: CLINIC | Age: 70
End: 2020-02-20

## 2020-02-20 LAB — COLOGUARD-ABSTRACT: NEGATIVE

## 2020-03-02 ENCOUNTER — ALLIED HEALTH/NURSE VISIT (OUTPATIENT)
Dept: NURSING | Facility: CLINIC | Age: 70
End: 2020-03-02
Payer: COMMERCIAL

## 2020-03-02 DIAGNOSIS — Z23 NEED FOR PROPHYLACTIC VACCINATION AND INOCULATION AGAINST COMBINATIONS OF DISEASE: Primary | ICD-10-CM

## 2020-03-02 PROCEDURE — 99207 ZZC NO CHARGE NURSE ONLY: CPT

## 2020-03-02 PROCEDURE — 90636 HEP A/HEP B VACC ADULT IM: CPT | Mod: GA

## 2020-03-02 PROCEDURE — 96372 THER/PROPH/DIAG INJ SC/IM: CPT | Mod: GA

## 2020-03-02 NOTE — NURSING NOTE
Chief Complaint   Patient presents with     Imm/Inj   Prior to immunization administration, verified patients identity using patient s name and date of birth. Please see Immunization Activity for additional information.     Screening Questionnaire for Adult Immunization    Are you sick today?   No   Do you have allergies to medications, food, a vaccine component or latex?   No   Have you ever had a serious reaction after receiving a vaccination?   No   Do you have a long-term health problem with heart disease, lung disease, asthma, kidney disease, metabolic disease (e.g. diabetes), anemia, or other blood disorder?   No   Do you have cancer, leukemia, HIV/AIDS, or any other immune system problem?   No   In the past 3 months, have you taken medications that affect  your immune system, such as prednisone, other steroids, or anticancer drugs; drugs for the treatment of rheumatoid arthritis, Crohn s disease, or psoriasis; or have you had radiation treatments?   No   Have you had a seizure, or a brain or other nervous system problem?   No   During the past year, have you received a transfusion of blood or blood     products, or been given immune (gamma) globulin or antiviral drug?   No   For women: Are you pregnant or is there a chance you could become        pregnant during the next month?   No   Have you received any vaccinations in the past 4 weeks?   No     Immunization questionnaire answers were all negative.        Per orders of Amy Rea NP, injection of TWINRIX given by Malia Pearson CMA. Patient instructed to remain in clinic for 15 minutes afterwards, and to report any adverse reaction to me immediately.       Screening performed by Malia Pearson CMA on 3/2/2020 at 10:28 AM.

## 2020-06-22 ENCOUNTER — OFFICE VISIT (OUTPATIENT)
Dept: INTERNAL MEDICINE | Facility: CLINIC | Age: 70
End: 2020-06-22
Payer: COMMERCIAL

## 2020-06-22 VITALS
HEIGHT: 65 IN | OXYGEN SATURATION: 100 % | WEIGHT: 138.4 LBS | TEMPERATURE: 98.3 F | RESPIRATION RATE: 18 BRPM | HEART RATE: 78 BPM | DIASTOLIC BLOOD PRESSURE: 82 MMHG | SYSTOLIC BLOOD PRESSURE: 136 MMHG | BODY MASS INDEX: 23.06 KG/M2

## 2020-06-22 DIAGNOSIS — L73.9 FOLLICULITIS: Primary | ICD-10-CM

## 2020-06-22 PROCEDURE — 99214 OFFICE O/P EST MOD 30 MIN: CPT | Performed by: INTERNAL MEDICINE

## 2020-06-22 RX ORDER — DOXYCYCLINE HYCLATE 100 MG
100 TABLET ORAL 2 TIMES DAILY
Qty: 14 TABLET | Refills: 0 | Status: SHIPPED | OUTPATIENT
Start: 2020-06-22

## 2020-06-22 ASSESSMENT — MIFFLIN-ST. JEOR: SCORE: 1150.72

## 2020-06-22 NOTE — PROGRESS NOTES
"Dr Shahid's note      Patient's instructions / PLAN:                                                        Plan:  1. Doxycycline 100 mg twice a ay - antibiotic  2. Avoid sun exposure  3. Hold the iron while you are taking the antibiotic   4. Dermatology referral        ASSESSMENT & PLAN:                                                      (L73.9) Folliculitis  (primary encounter diagnosis)  Comment: I am not sure about the dx.  Plan: doxycycline hyclate (VIBRA-TABS) 100 MG tablet,        DERMATOLOGY REFERRAL               Chief complaint:                                                      Rash     SUBJECTIVE:                                                    History of present illness:       Alex Graham is a 70 year old female who presents to clinic today for the following health issues:  Patient has rash on groin area, thighs and back.    Rash  -- started June 19  -- very itching  -- no new food, detergent soap  -- she feels it is a little better   -- no new meds, no fam members with the same symptoms     Review of Systems:                                                      ROS: negative for fever, chills, cough, wheezes, chest pain, shortness of breath, vomiting, abdominal pain, leg swelling          OBJECTIVE:             Physical exam:  Blood pressure (!) 136/82, pulse 78, temperature 98.3  F (36.8  C), temperature source Oral, resp. rate 18, height 1.638 m (5' 4.5\"), weight 62.8 kg (138 lb 6.4 oz), SpO2 100 %, not currently breastfeeding.   NAD, appears comfortable  Skin: no rashes : pustules and scratch marks on the thigh, buttocks    PMHx: reviewed  Past Medical History:   Diagnosis Date     Alcohol abuse, in remission      Depressive disorder, not elsewhere classified      Other acne      Plantar fasciitis      Sciatica 2000    L4-5, L2-3      PSHx: reviewed  Past Surgical History:   Procedure Laterality Date     C NONSPECIFIC PROCEDURE      BREAST REDUCTION     COLONOSCOPY  12-15-08    normal  " repeat in 10 years     DISKECTOMY, LUMBAR, SINGLE SP  10/00    L4-5     HC OPEN TREATMENT PROX HUMERAL FRACTURE  1/00        Meds: reviewed  Current Outpatient Medications   Medication Sig Dispense Refill     IRON (FERROUS SULFATE) TABS 325 MG OR 1 TABLET DAILY       magnesium oxide 200 MG TABS Take 400 mg by mouth       multivitamin w/minerals (MULTI-VITAMIN) tablet Take 1 tablet by mouth daily       simvastatin (ZOCOR) 20 MG tablet 1 po daily 90 tablet 3     traZODone (DESYREL) 100 MG tablet Take 1 tablet (100 mg) by mouth nightly as needed for sleep 180 tablet 1     venlafaxine (EFFEXOR-ER) 150 MG TB24 TAKE 2 TABLETS (300 MG) BY MOUTH DAILY (WITH BREAKFAST) 180 tablet 1       Soc Hx: reviewed  Fam Hx: reviewed          Angelica Shahid MD  Internal Medicine

## 2020-06-22 NOTE — PATIENT INSTRUCTIONS
Plan:  1. Doxycycline 100 mg twice a ay - antibiotic  2. Avoid sun exposure  3. Hold the iron while you are taking the antibiotic   4. Dermatology referral

## 2020-07-01 ENCOUNTER — TRANSFERRED RECORDS (OUTPATIENT)
Dept: HEALTH INFORMATION MANAGEMENT | Facility: CLINIC | Age: 70
End: 2020-07-01

## 2020-12-16 DIAGNOSIS — E78.2 MIXED HYPERLIPIDEMIA: ICD-10-CM

## 2020-12-17 RX ORDER — SIMVASTATIN 20 MG
TABLET ORAL
Qty: 90 TABLET | Refills: 0 | Status: SHIPPED | OUTPATIENT
Start: 2020-12-17 | End: 2021-03-18

## 2023-11-09 DIAGNOSIS — Z12.11 COLON CANCER SCREENING: ICD-10-CM

## 2025-05-24 NOTE — NURSING NOTE
/82  Temp 98.4  F (36.9  C) (Oral)  Resp 24  Wt 172 lb 9.6 oz (78.3 kg)  SpO2 99%  BMI 29.17 kg/m2     RN to bedside. Pt states that she is feeling jittery.  Pt advised that the medication in the nebulizer can make her feel that way. Pt states that her breathing feels improved